# Patient Record
Sex: MALE | Race: BLACK OR AFRICAN AMERICAN | Employment: FULL TIME | URBAN - METROPOLITAN AREA
[De-identification: names, ages, dates, MRNs, and addresses within clinical notes are randomized per-mention and may not be internally consistent; named-entity substitution may affect disease eponyms.]

---

## 2018-10-11 ENCOUNTER — HOSPITAL ENCOUNTER (EMERGENCY)
Facility: CLINIC | Age: 48
Discharge: HOME | End: 2018-10-11
Attending: EMERGENCY MEDICINE | Admitting: EMERGENCY MEDICINE

## 2018-10-11 VITALS
RESPIRATION RATE: 16 BRPM | OXYGEN SATURATION: 98 % | DIASTOLIC BLOOD PRESSURE: 94 MMHG | SYSTOLIC BLOOD PRESSURE: 168 MMHG | TEMPERATURE: 98.3 F

## 2018-10-11 DIAGNOSIS — M54.9 ACUTE BILATERAL BACK PAIN, UNSPECIFIED BACK LOCATION: ICD-10-CM

## 2018-10-11 LAB
ALBUMIN UR-MCNC: NEGATIVE MG/DL
APPEARANCE UR: CLEAR
BILIRUB UR QL STRIP: NEGATIVE
COLOR UR AUTO: ABNORMAL
GLUCOSE UR STRIP-MCNC: NEGATIVE MG/DL
HGB UR QL STRIP: NEGATIVE
KETONES UR STRIP-MCNC: NEGATIVE MG/DL
LEUKOCYTE ESTERASE UR QL STRIP: NEGATIVE
MUCOUS THREADS #/AREA URNS LPF: PRESENT /LPF
NITRATE UR QL: NEGATIVE
PH UR STRIP: 6 PH (ref 5–7)
RBC #/AREA URNS AUTO: 0 /HPF (ref 0–2)
SOURCE: ABNORMAL
SP GR UR STRIP: 1.01 (ref 1–1.03)
SQUAMOUS #/AREA URNS AUTO: <1 /HPF (ref 0–1)
UROBILINOGEN UR STRIP-MCNC: 2 MG/DL (ref 0–2)
WBC #/AREA URNS AUTO: 2 /HPF (ref 0–5)

## 2018-10-11 PROCEDURE — 96372 THER/PROPH/DIAG INJ SC/IM: CPT | Performed by: EMERGENCY MEDICINE

## 2018-10-11 PROCEDURE — 81001 URINALYSIS AUTO W/SCOPE: CPT | Performed by: EMERGENCY MEDICINE

## 2018-10-11 PROCEDURE — 99284 EMERGENCY DEPT VISIT MOD MDM: CPT | Performed by: EMERGENCY MEDICINE

## 2018-10-11 PROCEDURE — 99284 EMERGENCY DEPT VISIT MOD MDM: CPT | Mod: Z6 | Performed by: EMERGENCY MEDICINE

## 2018-10-11 PROCEDURE — 25000128 H RX IP 250 OP 636: Performed by: EMERGENCY MEDICINE

## 2018-10-11 RX ORDER — NAPROXEN 500 MG/1
500 TABLET ORAL 2 TIMES DAILY WITH MEALS
Qty: 30 TABLET | Refills: 0 | Status: SHIPPED | OUTPATIENT
Start: 2018-10-11 | End: 2018-10-26

## 2018-10-11 RX ORDER — CYCLOBENZAPRINE HCL 10 MG
10 TABLET ORAL 3 TIMES DAILY PRN
Qty: 90 TABLET | Refills: 1 | Status: ON HOLD | OUTPATIENT
Start: 2018-10-11 | End: 2019-01-05

## 2018-10-11 RX ORDER — KETOROLAC TROMETHAMINE 30 MG/ML
30 INJECTION, SOLUTION INTRAMUSCULAR; INTRAVENOUS ONCE
Status: COMPLETED | OUTPATIENT
Start: 2018-10-11 | End: 2018-10-11

## 2018-10-11 RX ADMIN — KETOROLAC TROMETHAMINE 30 MG: 30 INJECTION, SOLUTION INTRAMUSCULAR at 21:30

## 2018-10-11 ASSESSMENT — ENCOUNTER SYMPTOMS
DYSURIA: 1
NUMBNESS: 0
BACK PAIN: 1

## 2018-10-11 NOTE — ED AVS SNAPSHOT
[facility], [place], Emergency Department    [address] [place] [address]    Phone:  [phone]                                       Kar Up Jr.   MRN: [id number]    Department:  [facility], Emergency Department   Date of Visit:  10/11/2018           After Visit Summary Signature Page     I have received my discharge instructions, and my questions have been answered. I have discussed any challenges I see with this plan with the nurse or doctor.    ..........................................................................................................................................  Patient/Patient Representative Signature      ..........................................................................................................................................  Patient Representative Print Name and Relationship to Patient    ..................................................               ................................................  Date                                   Time    ..........................................................................................................................................  Reviewed by Signature/Title    ...................................................              ..............................................  Date                                               Time          22EPIC Rev 08/18

## 2018-10-11 NOTE — ED AVS SNAPSHOT
[facility], Emergency Department    [address] [facility] [address]    Phone:  [phone]                                       Kar Up Jr.   MRN: [id number]    Department:  [facility], Emergency Department   Date of Visit:  10/11/2018           Patient Information     Date Of Birth          1970        Your diagnoses for this visit were:     Acute bilateral back pain, unspecified back location        You were seen by Mika Pulido DO.        Discharge Instructions         Please make an appointment to [facility] with [facility] (phone: [phone] as soon as possible even if entirely better.  General Neck and Back Pain    Both neck and back pain are usually caused by injury to the muscles or ligaments of the spine. Sometimes the disks that separate each bone of the spine may cause pain by pressing on a nearby nerve. Back and neck pain may appear after a sudden twisting or bending force (such as in a car accident), or sometimes after a simple awkward movement. In either case, muscle spasm is often present and adds to the pain.  Acute neck and back pain usually gets better in 1 to 2 weeks. Pain related to disk disease, arthritis in the spinal joints or spinal stenosis (narrowing of the spinal canal) can become chronic and last for months or years.  Back and neck pain are common problems. Most people feel better in 1 or 2 weeks, and most of the rest in 1 to 2 months. Most people can remain active.  People have and describe pain differently.    Pain can be sharp, stabbing, shooting, aching, cramping, or burning    Movement, standing, bending, lifting, sitting, or walking may worsen the pain    Pain can be localized to one spot or area, or it can be more generalized    Pain can spread or radiate upwards, downwards, to the front, or go down your arms    Muscle spasm may occur.  Most of the time mechanical problems with the muscles or spine cause the pain. it is usually  caused by an injury, whether known or not, to the muscles or ligaments. While illnesses can cause back pain, it is usually not caused by a serious illness. Pain is usually related to physical activity, whether sports, exercise, work, or normal activity. Sometimes it can occur without an identifiable cause. This can happen simply by stretching or moving wrong, without noting pain at the time. Other causes include:    Overexertion, lifting, pushing, pulling incorrectly or too aggressively.    Sudden twisting, bending or stretching from an accident (car or fall), or accidental movement.    Poor posture    Poor conditioning, lack of regular exercise    Spinal disc disease or arthritis    Stress    Pregnancy, or illness like appendicitis, bladder or kidney infection, pelvic infections   Home care    For neck pain: Use a comfortable pillow that supports the head and keeps the spine in a neutral position. The position of the head should not be tilted forward or backward.    When in bed, try to find a position of comfort. A firm mattress is best. Try lying flat on your back with pillows under your knees. You can also try lying on your side with your knees bent up towards your chest and a pillow between your knees.    At first, do not try to stretch out the sore spots. If there is a strain, it is not like the good soreness you get after exercising without an injury. In this case, stretching may make it worse.    Don't sit for long periods, as in long car rides or other travel. This puts more stress on the lower back than standing or walking.    During the first 24 to 72 hours after an injury, apply an ice pack to the painful area for 20 minutes and then remove it for 20 minutes over a period of 60 to 90 minutes or several times a day.     You can alternate ice and heat therapies. Talk with your healthcare provider about the best treatment for your back or neck pain. As a safety precaution, do not use a heating pad at  bedtime. Sleeping with a heating pad can lead to skin burns or tissue damage.    Therapeutic massage can help relax the back and neck muscles without stretching them.    Be aware of safe lifting methods and do not lift anything over 15 pounds until all the pain is gone.  Medicines  Talk to your healthcare provider before using medicine, especially if you have other medical problems or are taking other medicines.    You may use over-the-counter medicine to control pain, unless another pain medicine was prescribed. If you have chronic conditions like diabetes, liver or kidney disease, stomach ulcers,  gastrointestinal bleeding, or are taking blood thinner medicines.    Be careful if you are given pain medicines, narcotics, or medicine for muscle spasm. They can cause drowsiness, and can affect your coordination, reflexes, and judgment. Do not drive or operate heavy machinery.  Follow-up care  Follow up with your healthcare provider, or as advised. Physical therapy or further tests may be needed.  If X-rays were taken, you will be notified of any new findings that may affect your care.  Call 911  Call 911 if any of the following occur:    Trouble breathing    Confusion    Very drowsy or trouble awakening    Fainting or loss of consciousness    Rapid or very slow heart rate    Loss of bowel or bladder control  When to seek medical advice  Call your healthcare provider right away if any of these occur:    Pain becomes worse or spreads into your arms or legs    Weakness, numbness or pain in one or both arms or legs    Numbness in the groin area    Difficulty walking    Fever of 100.4 F (38 C) or higher, or as directed by your healthcare provider  Date Last Reviewed: 7/1/2016 2000-2017 [employer]. [address] [place] [address], [place] [address]. All rights reserved. This information is not intended as a substitute for professional medical care. Always follow your healthcare professional's  instructions.          24 Hour Appointment Hotline       To make an appointment at any [facility], call [phone] ([phone]). If you don't have a family doctor or clinic, we will help you find one. [facility] clinics are conveniently located to serve the needs of you and your family.             Review of your medicines      START taking        Dose / Directions Last dose taken    cyclobenzaprine 10 MG tablet   Commonly known as:  FLEXERIL   Dose:  10 mg   Quantity:  90 tablet        Take 1 tablet (10 mg) by mouth 3 times daily as needed for muscle spasms   Refills:  1        naproxen 500 MG tablet   Commonly known as:  NAPROSYN   Dose:  500 mg   Quantity:  30 tablet        Take 1 tablet (500 mg) by mouth 2 times daily (with meals) for 15 days   Refills:  0                Prescriptions were sent or printed at these locations (2 Prescriptions)                   Other Prescriptions                Printed at Department/Unit printer (2 of 2)         cyclobenzaprine (FLEXERIL) 10 MG tablet               naproxen (NAPROSYN) 500 MG tablet                Procedures and tests performed during your visit     UA with Microscopic      Orders Needing Specimen Collection     None      Pending Results     No orders found from 10/9/2018 to 10/12/2018.            Pending Culture Results     No orders found from 10/9/2018 to 10/12/2018.            Pending Results Instructions     If you had any lab results that were not finalized at the time of your Discharge, you can call the ED Lab Result RN at [phone]. You will be contacted by this team for any positive Lab results or changes in treatment. The nurses are available 7 days a week from 10A to 6:30P.  You can leave a message 24 hours per day and they will return your call.        Thank you for choosing [facility]       Thank you for choosing [facility] for your care. Our goal is always to provide you with excellent care. Hearing back from our patients is one way we can  "continue to improve our services. Please take a few minutes to complete the written survey that you may receive in the mail after you visit with us. Thank you!        [employer] Information     [facility] lets you send messages to your doctor, view your test results, renew your prescriptions, schedule appointments and more. To sign up, go to [email or web address] . Click on \"Log in\" on the left side of the screen, which will take you to the Welcome page. Then click on \"Sign up Now\" on the right side of the page.     You will be asked to enter the access code listed below, as well as some personal information. Please follow the directions to create your username and password.     Your access code is: [email or web address]  Expires: 2019 10:02 PM     Your access code will  in 90 days. If you need help or a new code, please call your [facility] clinic or [phone].        Care EveryWhere ID     This is your Care EveryWhere ID. This could be used by other organizations to access your [facility] medical records  [id number]        Equal Access to Services     [facility] : Hadrossy Lopez, ronnie white, meño conklin. So [facility] [phone].    ATENCIÓN: Si habla español, tiene a torres disposición servicios gratuitos de asistencia lingüística. Mark al [phone].    We comply with applicable federal civil rights laws and [place] laws. We do not discriminate on the basis of race, color, national origin, age, disability, sex, sexual orientation, or gender identity.            After Visit Summary       This is your record. Keep this with you and show to your community pharmacist(s) and doctor(s) at your next visit.                  "

## 2018-10-12 NOTE — ED PROVIDER NOTES
History     Chief Complaint   Patient presents with     Back Pain     The history is provided by the patient.     Kar Up Jr. is a 48 year old male with a history of type II diabetes, hypertension, and high cholesteral who presents in the Emergency Room with back pain.  Patient for the last 1-2 months has been having intermittent lower back pain; yesterday while standing on top of a 2 foot stool, he states he lost balance, but was able to catch himself before falling. After this incident, he had an acute exacerbation of his back pain and it has been constant since.  His pain has been in his bilateral lower back, but currently is worse on the left side.  He reports having associated tingling in this area as well, but denies any tingling or numbness in his legs. Due to the pain, patient took 3 ibuprofen tablets (200 mg each) last night and has been using an IcyHot cream, he reports minimal relief with these interventions. Patient states that he has had soreness of the back from lifting heavy loads at work but has not previously had any falls or accidents related to the back. Patient additionally complains of intermittent dysuria for the past few weeks.  He denies any penile discharge and reports only being sexually active with his wife.    Of note, patient recently moved to the [facility] and has not yet set up primary care.    I have reviewed the Medications, Allergies, Past Medical and Surgical History, and Social History in the [employer] system.    Past Medical History:   Diagnosis Date     Diabetes (H)        History reviewed. No pertinent surgical history.    No family history on file.    Social History   Substance Use Topics     Smoking status: Not on file     Smokeless tobacco: Not on file     Alcohol use Not on file       No current facility-administered medications for this encounter.      Current Outpatient Prescriptions   Medication     cyclobenzaprine (FLEXERIL) 10 MG tablet     naproxen (NAPROSYN)  500 MG tablet        Allergies   Allergen Reactions     Bactrim [Sulfamethoxazole W/Trimethoprim] Blisters         Review of Systems   Genitourinary: Positive for dysuria (Intermittent). Negative for discharge.   Musculoskeletal: Positive for back pain (Bilateral lower; worse on the left).   Neurological: Negative for numbness.        Positive paresthesias in left lower back    All other systems reviewed and are negative.      Physical Exam   BP: (!) 175/103  Heart Rate: 91  Temp: 98.3  F (36.8  C)  Resp: 16  SpO2: 97 %      Physical Exam   Constitutional: No distress.   HENT:   Head: Atraumatic.   Mouth/Throat: Oropharynx is clear and moist. No oropharyngeal exudate.   Eyes: Pupils are equal, round, and reactive to light. No scleral icterus.   Cardiovascular: Normal heart sounds.    Pulmonary/Chest: Breath sounds normal. No respiratory distress.   Abdominal: Soft. He exhibits no distension. There is no tenderness.   Musculoskeletal: He exhibits no edema.   Mild left lower back tenderness   Neurological: He is alert.   Skin: Skin is warm. He is not diaphoretic.   Psychiatric: He has a normal mood and affect. His behavior is normal.       ED Course   8:59 PM  The patient was seen and examined by Mika Pulido DO in [facility].     ED Course     Procedures           Labs Ordered and Resulted from Time of ED Arrival Up to the Time of Departure from the ED   ROUTINE UA WITH MICROSCOPIC - Abnormal; Notable for the following:        Result Value    Mucous Urine Present (*)     All other components within normal limits           Results for orders placed or performed during the hospital encounter of 10/11/18   UA with Microscopic   Result Value Ref Range    Color Urine Light Yellow     Appearance Urine Clear     Glucose Urine Negative NEG^Negative mg/dL    Bilirubin Urine Negative NEG^Negative    Ketones Urine Negative NEG^Negative mg/dL    Specific Gravity Urine 1.013 1.003 - 1.035    Blood Urine Negative NEG^Negative     pH Urine 6.0 5.0 - 7.0 pH    Protein Albumin Urine Negative NEG^Negative mg/dL    Urobilinogen mg/dL 2.0 0.0 - 2.0 mg/dL    Nitrite Urine Negative NEG^Negative    Leukocyte Esterase Urine Negative NEG^Negative    Source Midstream Urine     WBC Urine 2 0 - 5 /HPF    RBC Urine 0 0 - 2 /HPF    Squamous Epithelial /HPF Urine <1 0 - 1 /HPF    Mucous Urine Present (A) NEG^Negative /LPF         Assessments & Plan (with Medical Decision Making)   40-year-old male presents with a chief complaint of nontraumatic left lower and diffuse back pain.  Patient did not have any known falls, direct blow, car accidents.  He has had mild pain intermittently for several months but it was much worse when he stepped unexpectedly off a ladder and twisted.  He states ibuprofen has been helping with the pain.  Also had a complaint of intermittent dysuria.  His UA here looks clear patient is not concerned about STDs.  We will give the patient a prescription for Naprosyn as well as Flexeril.  Also given the contact number for the primary care center as he has recently moved to the area and does not have a primary care provider at this time.  Also advised him his blood pressure was elevated here.  Recommended he have this rechecked he follows up with primary care.    I have reviewed the nursing notes.    I have reviewed the findings, diagnosis, plan and need for follow up with the patient.    Discharge Medication List as of 10/11/2018 10:02 PM      START taking these medications    Details   cyclobenzaprine (FLEXERIL) 10 MG tablet Take 1 tablet (10 mg) by mouth 3 times daily as needed for muscle spasms, Disp-90 tablet, R-1, Local Print      naproxen (NAPROSYN) 500 MG tablet Take 1 tablet (500 mg) by mouth 2 times daily (with meals) for 15 days, Disp-30 tablet, R-0, Local Print             Final diagnoses:   Acute bilateral back pain, unspecified back location     I, Hima Jha, am serving as a trained medical scribe to document services  personally performed by Mika Pulido DO, based on the provider's statements to me.      I, Mika Pulido DO, was physically present and have reviewed and verified the accuracy of this note documented by Hima Jha.    10/11/2018   [facility], [place], EMERGENCY DEPARTMENT     Mika Pulido DO  10/11/18 [id number]

## 2018-10-12 NOTE — DISCHARGE INSTRUCTIONS
Please make an appointment to follow [facility] with [facility] (phone: [phone] as soon as possible even if entirely better.  General Neck and Back Pain    Both neck and back pain are usually caused by injury to the muscles or ligaments of the spine. Sometimes the disks that separate each bone of the spine may cause pain by pressing on a nearby nerve. Back and neck pain may appear after a sudden twisting or bending force (such as in a car accident), or sometimes after a simple awkward movement. In either case, muscle spasm is often present and adds to the pain.  Acute neck and back pain usually gets better in 1 to 2 weeks. Pain related to disk disease, arthritis in the spinal joints or spinal stenosis (narrowing of the spinal canal) can become chronic and last for months or years.  Back and neck pain are common problems. Most people feel better in 1 or 2 weeks, and most of the rest in 1 to 2 months. Most people can remain active.  People have and describe pain differently.    Pain can be sharp, stabbing, shooting, aching, cramping, or burning    Movement, standing, bending, lifting, sitting, or walking may worsen the pain    Pain can be localized to one spot or area, or it can be more generalized    Pain can spread or radiate upwards, downwards, to the front, or go down your arms    Muscle spasm may occur.  Most of the time mechanical problems with the muscles or spine cause the pain. it is usually caused by an injury, whether known or not, to the muscles or ligaments. While illnesses can cause back pain, it is usually not caused by a serious illness. Pain is usually related to physical activity, whether sports, exercise, work, or normal activity. Sometimes it can occur without an identifiable cause. This can happen simply by stretching or moving wrong, without noting pain at the time. Other causes include:    Overexertion, lifting, pushing, pulling incorrectly or too aggressively.    Sudden twisting,  bending or stretching from an accident (car or fall), or accidental movement.    Poor posture    Poor conditioning, lack of regular exercise    Spinal disc disease or arthritis    Stress    Pregnancy, or illness like appendicitis, bladder or kidney infection, pelvic infections   Home care    For neck pain: Use a comfortable pillow that supports the head and keeps the spine in a neutral position. The position of the head should not be tilted forward or backward.    When in bed, try to find a position of comfort. A firm mattress is best. Try lying flat on your back with pillows under your knees. You can also try lying on your side with your knees bent up towards your chest and a pillow between your knees.    At first, do not try to stretch out the sore spots. If there is a strain, it is not like the good soreness you get after exercising without an injury. In this case, stretching may make it worse.    Don't sit for long periods, as in long car rides or other travel. This puts more stress on the lower back than standing or walking.    During the first 24 to 72 hours after an injury, apply an ice pack to the painful area for 20 minutes and then remove it for 20 minutes over a period of 60 to 90 minutes or several times a day.     You can alternate ice and heat therapies. Talk with your healthcare provider about the best treatment for your back or neck pain. As a safety precaution, do not use a heating pad at bedtime. Sleeping with a heating pad can lead to skin burns or tissue damage.    Therapeutic massage can help relax the back and neck muscles without stretching them.    Be aware of safe lifting methods and do not lift anything over 15 pounds until all the pain is gone.  Medicines  Talk to your healthcare provider before using medicine, especially if you have other medical problems or are taking other medicines.    You may use over-the-counter medicine to control pain, unless another pain medicine was prescribed.  If you have chronic conditions like diabetes, liver or kidney disease, stomach ulcers,  gastrointestinal bleeding, or are taking blood thinner medicines.    Be careful if you are given pain medicines, narcotics, or medicine for muscle spasm. They can cause drowsiness, and can affect your coordination, reflexes, and judgment. Do not drive or operate heavy machinery.  Follow-up care  Follow up with your healthcare provider, or as advised. Physical therapy or further tests may be needed.  If X-rays were taken, you will be notified of any new findings that may affect your care.  Call 911  Call 911 if any of the following occur:    Trouble breathing    Confusion    Very drowsy or trouble awakening    Fainting or loss of consciousness    Rapid or very slow heart rate    Loss of bowel or bladder control  When to seek medical advice  Call your healthcare provider right away if any of these occur:    Pain becomes worse or spreads into your arms or legs    Weakness, numbness or pain in one or both arms or legs    Numbness in the groin area    Difficulty walking    Fever of 100.4 F (38 C) or higher, or as directed by your healthcare provider  Date Last Reviewed: 7/1/2016 2000-2017 [employer]. [address] [place] [address]. All rights reserved. This information is not intended as a substitute for professional medical care. Always follow your healthcare professional's instructions.

## 2018-10-12 NOTE — ED TRIAGE NOTES
Pt presents to ED for a complaint of lower back pain. PT was working when he lost his balance on a ladder and stepped off awkwardly and now has some back pain. Pt also complaining of dysuria.

## 2018-11-03 ENCOUNTER — HOSPITAL ENCOUNTER (EMERGENCY)
Facility: CLINIC | Age: 48
Discharge: HOME | End: 2018-11-03
Attending: EMERGENCY MEDICINE | Admitting: EMERGENCY MEDICINE
Payer: MEDICAID

## 2018-11-03 ENCOUNTER — APPOINTMENT (OUTPATIENT)
Dept: GENERAL RADIOLOGY | Facility: CLINIC | Age: 48
End: 2018-11-03
Attending: EMERGENCY MEDICINE
Payer: MEDICAID

## 2018-11-03 VITALS
DIASTOLIC BLOOD PRESSURE: 82 MMHG | TEMPERATURE: 98.2 F | HEART RATE: 102 BPM | OXYGEN SATURATION: 100 % | RESPIRATION RATE: 20 BRPM | HEIGHT: 74 IN | SYSTOLIC BLOOD PRESSURE: 148 MMHG

## 2018-11-03 DIAGNOSIS — R07.89 OTHER CHEST PAIN: ICD-10-CM

## 2018-11-03 DIAGNOSIS — Z79.4 OTHER SPECIFIED DIABETES MELLITUS WITHOUT COMPLICATION, WITH LONG-TERM CURRENT USE OF INSULIN (H): ICD-10-CM

## 2018-11-03 DIAGNOSIS — E13.9 OTHER SPECIFIED DIABETES MELLITUS WITHOUT COMPLICATION, WITH LONG-TERM CURRENT USE OF INSULIN (H): ICD-10-CM

## 2018-11-03 DIAGNOSIS — Z79.4 ENCOUNTER FOR LONG-TERM (CURRENT) USE OF INSULIN (H): ICD-10-CM

## 2018-11-03 LAB
ANION GAP SERPL CALCULATED.3IONS-SCNC: 8 MMOL/L (ref 3–14)
BASOPHILS # BLD AUTO: 0 10E9/L (ref 0–0.2)
BASOPHILS NFR BLD AUTO: 0.2 %
BUN SERPL-MCNC: 19 MG/DL (ref 7–30)
CALCIUM SERPL-MCNC: 8.6 MG/DL (ref 8.5–10.1)
CHLORIDE SERPL-SCNC: 98 MMOL/L (ref 94–109)
CO2 BLDCOV-SCNC: 28 MMOL/L (ref 21–28)
CO2 SERPL-SCNC: 25 MMOL/L (ref 20–32)
CREAT SERPL-MCNC: 1.37 MG/DL (ref 0.66–1.25)
DIFFERENTIAL METHOD BLD: NORMAL
EOSINOPHIL # BLD AUTO: 0.1 10E9/L (ref 0–0.7)
EOSINOPHIL NFR BLD AUTO: 0.9 %
ERYTHROCYTE [DISTWIDTH] IN BLOOD BY AUTOMATED COUNT: 12.7 % (ref 10–15)
GFR SERPL CREATININE-BSD FRML MDRD: 55 ML/MIN/1.7M2
GLUCOSE BLDC GLUCOMTR-MCNC: 256 MG/DL (ref 70–99)
GLUCOSE BLDC GLUCOMTR-MCNC: 279 MG/DL (ref 70–99)
GLUCOSE BLDC GLUCOMTR-MCNC: >600 MG/DL (ref 70–99)
GLUCOSE SERPL-MCNC: 648 MG/DL (ref 70–99)
HCT VFR BLD AUTO: 48.9 % (ref 40–53)
HGB BLD-MCNC: 16.3 G/DL (ref 13.3–17.7)
IMM GRANULOCYTES # BLD: 0 10E9/L (ref 0–0.4)
IMM GRANULOCYTES NFR BLD: 0.3 %
LACTATE BLD-SCNC: 1.6 MMOL/L (ref 0.7–2.1)
LYMPHOCYTES # BLD AUTO: 2.5 10E9/L (ref 0.8–5.3)
LYMPHOCYTES NFR BLD AUTO: 29 %
MCH RBC QN AUTO: 30.9 PG (ref 26.5–33)
MCHC RBC AUTO-ENTMCNC: 33.3 G/DL (ref 31.5–36.5)
MCV RBC AUTO: 93 FL (ref 78–100)
MONOCYTES # BLD AUTO: 0.3 10E9/L (ref 0–1.3)
MONOCYTES NFR BLD AUTO: 3.7 %
NEUTROPHILS # BLD AUTO: 5.7 10E9/L (ref 1.6–8.3)
NEUTROPHILS NFR BLD AUTO: 65.9 %
NRBC # BLD AUTO: 0 10*3/UL
NRBC BLD AUTO-RTO: 0 /100
PCO2 BLDV: 46 MM HG (ref 40–50)
PH BLDV: 7.39 PH (ref 7.32–7.43)
PLATELET # BLD AUTO: 177 10E9/L (ref 150–450)
PO2 BLDV: 56 MM HG (ref 25–47)
POTASSIUM SERPL-SCNC: 4.8 MMOL/L (ref 3.4–5.3)
RBC # BLD AUTO: 5.28 10E12/L (ref 4.4–5.9)
SAO2 % BLDV FROM PO2: 88 %
SODIUM SERPL-SCNC: 132 MMOL/L (ref 133–144)
TROPONIN I SERPL-MCNC: <0.015 UG/L (ref 0–0.04)
WBC # BLD AUTO: 8.7 10E9/L (ref 4–11)

## 2018-11-03 PROCEDURE — 84484 ASSAY OF TROPONIN QUANT: CPT | Performed by: EMERGENCY MEDICINE

## 2018-11-03 PROCEDURE — 25000132 ZZH RX MED GY IP 250 OP 250 PS 637: Performed by: EMERGENCY MEDICINE

## 2018-11-03 PROCEDURE — 99285 EMERGENCY DEPT VISIT HI MDM: CPT | Mod: Z6 | Performed by: EMERGENCY MEDICINE

## 2018-11-03 PROCEDURE — 85025 COMPLETE CBC W/AUTO DIFF WBC: CPT | Performed by: EMERGENCY MEDICINE

## 2018-11-03 PROCEDURE — 82803 BLOOD GASES ANY COMBINATION: CPT

## 2018-11-03 PROCEDURE — 25000128 H RX IP 250 OP 636: Performed by: EMERGENCY MEDICINE

## 2018-11-03 PROCEDURE — 00000146 ZZHCL STATISTIC GLUCOSE BY METER IP

## 2018-11-03 PROCEDURE — 80048 BASIC METABOLIC PNL TOTAL CA: CPT | Performed by: EMERGENCY MEDICINE

## 2018-11-03 PROCEDURE — 25000131 ZZH RX MED GY IP 250 OP 636 PS 637: Performed by: EMERGENCY MEDICINE

## 2018-11-03 PROCEDURE — 93010 ELECTROCARDIOGRAM REPORT: CPT | Mod: Z6 | Performed by: EMERGENCY MEDICINE

## 2018-11-03 PROCEDURE — 99285 EMERGENCY DEPT VISIT HI MDM: CPT | Mod: 25

## 2018-11-03 PROCEDURE — 96374 THER/PROPH/DIAG INJ IV PUSH: CPT

## 2018-11-03 PROCEDURE — 96372 THER/PROPH/DIAG INJ SC/IM: CPT

## 2018-11-03 PROCEDURE — 71046 X-RAY EXAM CHEST 2 VIEWS: CPT

## 2018-11-03 PROCEDURE — 83605 ASSAY OF LACTIC ACID: CPT

## 2018-11-03 PROCEDURE — 96361 HYDRATE IV INFUSION ADD-ON: CPT | Mod: 59

## 2018-11-03 RX ADMIN — SODIUM CHLORIDE 1000 ML: 9 INJECTION, SOLUTION INTRAVENOUS at 01:28

## 2018-11-03 RX ADMIN — HUMAN INSULIN 10 UNITS: 100 INJECTION, SOLUTION SUBCUTANEOUS at 02:00

## 2018-11-03 RX ADMIN — INSULIN ASPART 5 UNITS: 100 INJECTION, SOLUTION INTRAVENOUS; SUBCUTANEOUS at 05:05

## 2018-11-03 RX ADMIN — INSULIN DETEMIR 20 UNITS: 100 INJECTION, SOLUTION SUBCUTANEOUS at 05:05

## 2018-11-03 ASSESSMENT — ENCOUNTER SYMPTOMS
COUGH: 1
LIGHT-HEADEDNESS: 1

## 2018-11-03 NOTE — DISCHARGE INSTRUCTIONS
Thank you for coming to the [facility] Emergency Department.     Please restart your insulin: levemir 20 units at bedtime and novolog 5 units before meals.     Check blood sugar at meals and bedtime. Return to ER for blood sugars over 400.     Please make an appointment to follow [facility] with [facility] (phone: [phone] as soon as possible.    You could also follow up at [facility]. Call as soon as possible to schedule.   [phone]  [email or web address]  [address] [place] [address]

## 2018-11-03 NOTE — ED AVS SNAPSHOT
[facility], Emergency Department    [address] [facility] [address]    Phone:  [phone]                                       Kar Up Jr.   MRN: [id number]    Department:  [facility], Emergency Department   Date of Visit:  11/3/2018           Patient Information     Date Of Birth          1970        Your diagnoses for this visit were:     Other specified diabetes mellitus without complication, with long-term current use of insulin (H)        You were seen by Beverly Agudelo MD.        Discharge Instructions       Thank you for coming to the [facility] Emergency Department.     Please restart your insulin: levemir 20 units at bedtime and novolog 5 units before meals.     Check blood sugar at meals and bedtime. Return to ER for blood sugars over 400.     Please make an appointment to follow up with [facility] (phone: [phone] as soon as possible.    You could also follow up at [facility]. Call as soon as possible to schedule.   [phone]  [email or web address]/  [address] [place] [address]        24 Hour Appointment Hotline       To make an appointment at any [facility], call [phone] ([phone]). If you don't have a family doctor or clinic, we will help you find one. [facility] clinics are conveniently located to serve the needs of you and your family.             Review of your medicines      START taking        Dose / Directions Last dose taken    insulin aspart 100 UNIT/ML injection   Commonly known as:  NovoLOG FLEXPEN   Quantity:  15 mL        5 units before breakfast, 5 units before lunch, 5 units before dinner   Refills:  1        insulin detemir 100 UNIT/ML injection   Commonly known as:  LEVEMIR FLEXPEN/FLEXTOUCH   Dose:  20 Units   Quantity:  15 mL        Inject 20 Units Subcutaneous At Bedtime   Refills:  1          Our records show that you are taking the medicines listed below. If these are  incorrect, please call your family doctor or clinic.        Dose / Directions Last dose taken    cyclobenzaprine 10 MG tablet   Commonly known as:  FLEXERIL   Dose:  10 mg   Quantity:  90 tablet        Take 1 tablet (10 mg) by mouth 3 times daily as needed for muscle spasms   Refills:  1                Prescriptions were sent or printed at these locations (2 Prescriptions)                   Other Prescriptions                Printed at Department/Unit printer (2 of 2)         insulin aspart (NOVOLOG FLEXPEN) 100 UNIT/ML injection               insulin detemir (LEVEMIR FLEXPEN/FLEXTOUCH) 100 UNIT/ML injection                Procedures and tests performed during your visit     Procedure/Test Number of Times Performed    Basic metabolic panel 1    CBC with platelets differential 1    Chest XR,  PA & LAT 1    EKG 12 lead 1    Glucose by meter 2    ISTAT CG4 gases lactate amrita nursing POCT 1    ISTAT gases lactate amrita POCT 1    Troponin I 1      Orders Needing Specimen Collection     None      Pending Results     Date and Time Order Name Status Description    11/3/2018 0050 Chest XR,  PA & LAT Preliminary     11/3/2018 0026 EKG 12 lead Preliminary             Pending Culture Results     No orders found from 11/1/2018 to 11/4/2018.            Pending Results Instructions     If you had any lab results that were not finalized at the time of your Discharge, you can call the ED Lab Result RN at [phone]. You will be contacted by this team for any positive Lab results or changes in treatment. The nurses are available 7 days a week from 10A to 6:30P.  You can leave a message 24 hours per day and they will return your call.        Thank you for choosing [facility]       Thank you for choosing [facility] for your care. Our goal is always to provide you with excellent care. Hearing back from our patients is one way we can continue to improve our services. Please take a few minutes to complete the written survey that you may  "receive in the mail after you visit with us. Thank you!        [employer] Information     [employer] lets you send messages to your doctor, view your test results, renew your prescriptions, schedule appointments and more. To sign up, go to [email or web address] . Click on \"Log in\" on the left side of the screen, which will take you to the Welcome page. Then click on \"Sign up Now\" on the right side of the page.     You will be asked to enter the access code listed below, as well as some personal information. Please follow the directions to create your username and password.     Your access code is: [email or web address]  Expires: 2019 10:02 PM     Your access code will  in 90 days. If you need help or a new code, please call your [facility] clinic or [phone].        Care EveryWhere ID     This is your Care EveryWhere ID. This could be used by other organizations to access your [facility] medical records  [id number]        Equal Access to Services     [facility] : Hadii drake maldonadoo Sooswaldoali, waaxda luqadaha, qaybta kaalmada adeantoinetteyaadalberto, meño lowe . So [facility] [phone].    ATENCIÓN: Si habla español, tiene a torres disposición servicios gratuitos de asistencia lingüística. Llame al [phone].    We comply with applicable federal civil rights laws and [place] laws. We do not discriminate on the basis of race, color, national origin, age, disability, sex, sexual orientation, or gender identity.            After Visit Summary       This is your record. Keep this with you and show to your community pharmacist(s) and doctor(s) at your next visit.                  "

## 2018-11-03 NOTE — ED AVS SNAPSHOT
[facility] [place], Emergency Department    [address] [place] [address]    Phone:  [phone]                                       Kar Up Jr.   MRN: [id number]    Department:  [facility], Emergency Department   Date of Visit:  11/3/2018           After Visit Summary Signature Page     I have received my discharge instructions, and my questions have been answered. I have discussed any challenges I see with this plan with the nurse or doctor.    ..........................................................................................................................................  Patient/Patient Representative Signature      ..........................................................................................................................................  Patient Representative Print Name and Relationship to Patient    ..................................................               ................................................  Date                                   Time    ..........................................................................................................................................  Reviewed by Signature/Title    ...................................................              ..............................................  Date                                               Time          22EPIC Rev 08/18

## 2018-11-03 NOTE — ED PROVIDER NOTES
[place] EMERGENCY DEPARTMENT ([facility])  11/03/18   History     Chief Complaint   Patient presents with     Hyperglycemia     The history is provided by the patient.     Kar Up Jr. is a 48 year old male with a medical history significant for diabetes mellitus, hypertension and high cholesterol who presents to the Emergency Department for evaluation of hyperglycemia.  Patient reports that he moved to the [place] a few months ago and has not yet established primary care.  Patient also does not currently have medical insurance; however, does have an appointment to have medical insurance started.  Patient reports that he has not been taking his diabetes mellitus regimen for a few months.  Patient is on NovoLog 5 units before every meal, Levemir 20-25 units at bedtime. He was previously on metformin.  Patient reports that his NovoLog and Levemir have not been refrigerated, so he is unsure whether or not they are still good.  Patient also reports that he has not been taking his blood pressure medications or his high cholesterol medications as they make him feel drowsy.  Patient reports that he was diagnosed with diabetes mellitus 20 years ago.  Patient here is complaining of polyuria and lightheadedness.  He is also complaining of exertional left-sided chest pain which has been present for the past week; which he reports is mostly present when he is changing positions.  He describes it as a sharp type pain.  Patient reports that the pain has been worsening over the past week.  He denies any worsening of the pain when laying down flat.  Patient is also complaining of a productive cough.  Patient denies any history of cardiac disease and denies any past abdominal surgeries.    I have reviewed the Medications, Allergies, Past Medical and Surgical History, and Social History in the [employer] system.    Past Medical History:   Diagnosis Date     Diabetes (H)        History reviewed. No pertinent surgical  "history.    No family history on file.    Social History   Substance Use Topics     Smoking status: Current Every Day Smoker     Packs/day: 1.50     Smokeless tobacco: Never Used     Alcohol use No       No current facility-administered medications for this encounter.      Current Outpatient Prescriptions   Medication     insulin aspart (NOVOLOG FLEXPEN) 100 UNIT/ML injection     insulin detemir (LEVEMIR FLEXPEN/FLEXTOUCH) 100 UNIT/ML injection     cyclobenzaprine (FLEXERIL) 10 MG tablet        Allergies   Allergen Reactions     Bactrim [Sulfamethoxazole W/Trimethoprim] Blisters         Review of Systems   Respiratory: Positive for cough (productive).    Cardiovascular: Positive for chest pain (left sided; exertional with position changes).   Endocrine: Positive for polyuria.   Neurological: Positive for light-headedness.   All other systems reviewed and are negative.      Physical Exam   BP: (!) 174/104  Pulse: 102  Heart Rate: 95  Temp: 98.2  F (36.8  C)  Resp: 20  Height: 188 cm (6' 2\")  SpO2: 97 %      Physical Exam   Gen:A&Ox3, no acute distress  HEENT:PERRL, no facial tenderness or wounds, head atraumatic, oropharynx clear, mucous membranes moist, TMs clear bilaterally  Neck:no bony tenderness or step offs, no JVD, trachea midline  Back: no CVA tenderness, no midline bony tenderness  CV:RRR without murmurs  PULM:Clear to auscultation bilaterally  Abd:soft, nontender, nondistended. Bowel sounds present and normal  UE:No traumatic injuries, skin normal  LE:no traumatic injuries, skin normal, no LE edema  Neuro:CN II-XII intact, strength 5/5 throughout, gait stable.   Skin: no rashes or ecchymoses      ED Course   12:33 AM  The patient was seen and examined by Beverly Agudelo MD in Room ED11.     ED Course     Procedures             EKG Interpretation:      Interpreted by Beverly Agudelo  Time reviewed: 12:31AM  Symptoms at time of EKG: chest discomfort and hyperglycemia  Rhythm: normal sinus   Rate: 98  Axis: " normal  Ectopy: none  Conduction: normal  ST Segments/ T Waves: No ST-T wave changes  Q Waves: none  Comparison to prior: No old EKG available    Clinical Impression: normal EKG      Critical Care time:  none             Labs Ordered and Resulted from Time of ED Arrival Up to the Time of Departure from the ED   BASIC METABOLIC PANEL - Abnormal; Notable for the following:        Result Value    Sodium 132 (*)     Glucose 648 (*)     Creatinine 1.37 (*)     GFR Estimate 55 (*)     All other components within normal limits   GLUCOSE BY METER - Abnormal; Notable for the following:     Glucose >600 (*)     All other components within normal limits   GLUCOSE BY METER - Abnormal; Notable for the following:     Glucose 256 (*)     All other components within normal limits   ISTAT  GASES LACTATE MARYCARMEN POCT - Abnormal; Notable for the following:     PO2 Venous 56 (*)     All other components within normal limits   CBC WITH PLATELETS DIFFERENTIAL   TROPONIN I   ISTAT CG4 GASES LACTATE MARYCARMEN NURSING POCT            Assessments & Plan (with Medical Decision Making)   49 yo M with a hx of insulin-dependent diabetes presenting with symptomatic hyperglycemia in the setting of missed insulin dosing for several months. Pt newly relocated to the [place] and is without established primary care.   Arrived with HTN with /104 and tachycardia with .   IV access obtained and lab testing done. Labs without acidosis, anion gap or significant electrolyte abnormalities.   Glucose >600 initially. Treated with 1L of 0.9NS followed by 10 units of regular insulin IV.   EKG without acute ischemic abnormalities and troponin negative.   Follow up blood sugars 250-270. Pt felt improved and requested to eat, so he was given an additional 5 units IM Novolog and his daily dose of Levemir. He was provided with a new supply of short and long acting insulin and referred to primary care.   Discharged.     I have reviewed the nursing notes.    I have  reviewed the findings, diagnosis, plan and need for follow up with the patient.    Discharge Medication List as of 11/3/2018  5:02 AM      START taking these medications    Details   insulin aspart (NOVOLOG FLEXPEN) 100 UNIT/ML injection 5 units before breakfast, 5 units before lunch, 5 units before dinner, Disp-15 mL, R-1, Local Print      insulin detemir (LEVEMIR FLEXPEN/FLEXTOUCH) 100 UNIT/ML injection Inject 20 Units Subcutaneous At Bedtime, Disp-15 mL, R-1, Local Print             Final diagnoses:   Other specified diabetes mellitus without complication, with long-term current use of insulin (H)     IOwen, am serving as a trained medical scribe to document services personally performed by Beverly Agudelo MD, based on the provider's statements to me.   IBeverly MD, was physically present and have reviewed and verified the accuracy of this note documented by Owen Spangler.    11/3/2018   [facility] [place], EMERGENCY DEPARTMENT    MD AMITA Corcoran Katrina Anne, MD  11/12/18 1007

## 2018-11-03 NOTE — ED TRIAGE NOTES
"Pt arrives with hyperglycemia, BGs in the 500s about one hour ago. Pt states he hasn't taken his insulin \"in a while\" due to low BGs. Reports excessive thirst and urination. Also reports chest pain x1 week.   "

## 2018-11-04 LAB — INTERPRETATION ECG - MUSE: NORMAL

## 2018-11-15 ENCOUNTER — HOSPITAL ENCOUNTER (OUTPATIENT)
Facility: CLINIC | Age: 48
Setting detail: OBSERVATION
Discharge: HOME | End: 2018-11-17
Attending: EMERGENCY MEDICINE | Admitting: EMERGENCY MEDICINE
Payer: MEDICAID

## 2018-11-15 DIAGNOSIS — R00.0 SINUS TACHYCARDIA: ICD-10-CM

## 2018-11-15 DIAGNOSIS — N28.9 RENAL INSUFFICIENCY: ICD-10-CM

## 2018-11-15 DIAGNOSIS — R73.9 HYPERGLYCEMIA: ICD-10-CM

## 2018-11-15 LAB
BASOPHILS # BLD AUTO: 0 10E9/L (ref 0–0.2)
BASOPHILS NFR BLD AUTO: 0.3 %
DIFFERENTIAL METHOD BLD: NORMAL
EOSINOPHIL # BLD AUTO: 0.1 10E9/L (ref 0–0.7)
EOSINOPHIL NFR BLD AUTO: 0.9 %
ERYTHROCYTE [DISTWIDTH] IN BLOOD BY AUTOMATED COUNT: 12.5 % (ref 10–15)
GLUCOSE BLDC GLUCOMTR-MCNC: >600 MG/DL (ref 70–99)
HCT VFR BLD AUTO: 46.2 % (ref 40–53)
HGB BLD-MCNC: 15.9 G/DL (ref 13.3–17.7)
IMM GRANULOCYTES # BLD: 0 10E9/L (ref 0–0.4)
IMM GRANULOCYTES NFR BLD: 0.1 %
KETONES BLD-SCNC: 0.1 MMOL/L (ref 0–0.6)
LYMPHOCYTES # BLD AUTO: 2.2 10E9/L (ref 0.8–5.3)
LYMPHOCYTES NFR BLD AUTO: 27.9 %
MCH RBC QN AUTO: 30.7 PG (ref 26.5–33)
MCHC RBC AUTO-ENTMCNC: 34.4 G/DL (ref 31.5–36.5)
MCV RBC AUTO: 89 FL (ref 78–100)
MONOCYTES # BLD AUTO: 0.5 10E9/L (ref 0–1.3)
MONOCYTES NFR BLD AUTO: 6.4 %
NEUTROPHILS # BLD AUTO: 5.2 10E9/L (ref 1.6–8.3)
NEUTROPHILS NFR BLD AUTO: 64.4 %
NRBC # BLD AUTO: 0 10*3/UL
NRBC BLD AUTO-RTO: 0 /100
PLATELET # BLD AUTO: 163 10E9/L (ref 150–450)
RBC # BLD AUTO: 5.18 10E12/L (ref 4.4–5.9)
WBC # BLD AUTO: 8 10E9/L (ref 4–11)

## 2018-11-15 PROCEDURE — 84484 ASSAY OF TROPONIN QUANT: CPT | Performed by: EMERGENCY MEDICINE

## 2018-11-15 PROCEDURE — 80053 COMPREHEN METABOLIC PANEL: CPT | Performed by: EMERGENCY MEDICINE

## 2018-11-15 PROCEDURE — 93010 ELECTROCARDIOGRAM REPORT: CPT | Mod: Z6 | Performed by: EMERGENCY MEDICINE

## 2018-11-15 PROCEDURE — 99285 EMERGENCY DEPT VISIT HI MDM: CPT | Mod: 25 | Performed by: EMERGENCY MEDICINE

## 2018-11-15 PROCEDURE — 00000146 ZZHCL STATISTIC GLUCOSE BY METER IP

## 2018-11-15 PROCEDURE — 83036 HEMOGLOBIN GLYCOSYLATED A1C: CPT | Performed by: EMERGENCY MEDICINE

## 2018-11-15 PROCEDURE — 93005 ELECTROCARDIOGRAM TRACING: CPT | Performed by: EMERGENCY MEDICINE

## 2018-11-15 PROCEDURE — 85025 COMPLETE CBC W/AUTO DIFF WBC: CPT | Performed by: EMERGENCY MEDICINE

## 2018-11-15 PROCEDURE — 82010 KETONE BODYS QUAN: CPT | Performed by: EMERGENCY MEDICINE

## 2018-11-15 ASSESSMENT — ENCOUNTER SYMPTOMS
DIARRHEA: 0
NAUSEA: 0
SHORTNESS OF BREATH: 0
FREQUENCY: 1
CHILLS: 0
FEVER: 0
POLYDIPSIA: 1
VOMITING: 0

## 2018-11-15 NOTE — IP AVS SNAPSHOT
Unit 6D Observation [address] [address]    Phone:  [phone]    Fax:  [phone]                                       After Visit Summary   11/15/2018    Kar Up Jr.    MRN: [id number]           After Visit Summary Signature Page     I have received my discharge instructions, and my questions have been answered. I have discussed any challenges I see with this plan with the nurse or doctor.    ..........................................................................................................................................  Patient/Patient Representative Signature      ..........................................................................................................................................  Patient Representative Print Name and Relationship to Patient    ..................................................               ................................................  Date                                   Time    ..........................................................................................................................................  Reviewed by Signature/Title    ...................................................              ..............................................  Date                                               Time          22EPIC Rev 08/18

## 2018-11-15 NOTE — LETTER
November 17, 2018      Kar Up Jr.  [address]   [place] [address]        To Whom It May Concern:    Kar Up Jr.  was seen on 11/15/18 at the emergency department and he was admitted through today 11/17/18. Please excuse him from work through 11/18/18. If you have any further question, please call [phone] and ask for the ED observation provider.      Sincerely,        Emergency Department Observation Unit APPs.

## 2018-11-15 NOTE — IP AVS SNAPSHOT
MRN:[id number]                      After Visit Summary   11/15/2018    Kar Up Jr.    MRN: [id number]           Thank you!     Thank you for choosing [facility] for your care. Our goal is always to provide you with excellent care. Hearing back from our patients is one way we can continue to improve our services. Please take a few minutes to complete the written survey that you may receive in the mail after you visit with us. Thank you!        Patient Information     Date Of Birth          1970        About your hospital stay     You were admitted on:  November 16, 2018 You last received care in the:  Unit 6D Observation [facility]    You were discharged on:  November 17, 2018        Reason for your hospital stay       Hyperglycemia                  Who to Call     For medical emergencies, please call 911.  For non-urgent questions about your medical care, please call your primary care provider or clinic, None          Attending Provider     Provider Specialty    Carol Castro MD Emergency Medicine    Belen Saenz MD Emergency Medicine    Alfonso Varela MD [facility], Miranda Bernstein MD Emergency Medicine       Primary Care Provider Fax #    Physician No Ref-Primary [phone]      After Care Instructions     Activity       Your activity upon discharge: activity as tolerated            Diet       Follow this diet upon discharge: Diabetic diet                  Follow-up Appointments     Adult [facility] Follow-up and recommended labs and tests       --Follow up with primary care provider at [facility] on Monday 11/19/18.   --It is important that you take your medications and check your blood glucose as following:  -Please take your medications as following and check your blood sugar as instructed below      -Glargine 32 units qAM starting today      -Aspart for meals: 12 units per meal.  Work on eliminating snacks or having low/no carb snacks.      -Aspart  for correction: increased to high intensity and hs       HIGH INSULIN RESISTANCE DOSING         Do not take bedtime correction Insulin if BG less than 200.        For  - 224 take 1 units.        For  - 249 take 2 units.        For  - 274 take 3 units.        For  - 299 take 4 units.        For  - 324 take 5 units.        For  - 349 take 6 units.        For BG greater than or equal to 350 give 7 units.        Notify your provider if glucose greater than or equal to 350 after administration of correction dose.      -Continue metformin 500mg with bfast, 500mg with supper--- increase to 1000mg BID on 11/19/18 if tolerating.  -Check blood sugar before meals and bedtime.  -Please go to your appointment with [facility]  -Cpeptide and ARI antibody are pending-- follow up on results at [facility] on Monday.                  Further instructions from your care team       Primary Care Physician appointment scheduled for you:    Monday, 11/19/18 at 2:40pm.  Please arrive at 2:25pm.  [facility] ([facility])  [address] [place] [address]  [id number]  [phone]    Please bring hospital discharge summary with you to appointment, and any medications that you are taking.    DIABETES  Plan for home:  -Metformin 500mg with breakfast, 500mg with supper.  Increase to 1000mg with bfast, 1000mg with supper on 11/19 if tolerating.  -Lantus 32 units every morning (you can substitute Lantus with Levemir if needed)  -Novolog for meals: 12 units per meal.  Try to avoid snacking between meals.  If you need to have a snack try to have something that is lower in carbohydrates (eggs, cheese, meat, water).  -Novolog for correction based on the following sliding scales:    Sliding Scale for blood sugar checked before meals:  Do Not give Correction Insulin if Pre-Meal BG less than 140.   For Pre-Meal  - 164 give 1 unit.   For Pre-Meal  - 189 give 2 units.   For Pre-Meal  - 214  "give 3 units.   For Pre-Meal  - 239 give 4 units.   For Pre-Meal  - 264 give 5 units.   For Pre-Meal  - 289 give 6 units.   For Pre-Meal  - 314 give 7 units.   For Pre-Meal  - 339 give 8 units.   For Pre-Meal  - 364 give 9 units.   For Pre-Meal BG greater than or equal to 365 give 10 units    Sliding Scale for blood sugar checked at bedtime:  Do Not give Bedtime Correction Insulin if BG less than 200.   For  - 224 give 1 units.   For  - 249 give 2 units.   For  - 274 give 3 units.   For  - 299 give 4 units.   For  - 324 give 5 units.   For  - 349 give 6 units.   For BG greater than or equal to 350 give 7 units.     -Check blood sugar before meals and bedtime    -Follow up on diabetes at [facility] on Monday 11/19/18.    Pending Results     Date and Time Order Name Status Description    11/16/2018 1502 Glutamic acid decarboxylase antibody In process     11/16/2018 1502 C-peptide In process             Statement of Approval     Ordered          11/17/18 1415  I have reviewed and agree with all the recommendations and orders detailed in this document.  EFFECTIVE NOW     Approved and electronically signed by:  Marilu Murphy APRN CNP             Admission Information     Date & Time Provider Department Dept. Phone    11/15/2018 Miranda Jimenez MD Unit 6D Observation [facility] [place] [phone]      Your Vitals Were     Blood Pressure Pulse Temperature Respirations Height Weight    134/91 (BP Location: Left arm) 111 97.7  F (36.5  C) (Oral) 16 1.88 m (6' 2\") 126.3 kg (278 lb 8 oz)    Pulse Oximetry BMI (Body Mass Index)                98% 35.76 kg/m2          MyChart Information     [facility] lets you send messages to your doctor, view your test results, renew your prescriptions, schedule appointments and more. To sign up, go to [email or web address] . Click on \"Log in\" on the left side of the screen, which will take you to the Welcome " "page. Then click on \"Sign up Now\" on the right side of the page.     You will be asked to enter the access code listed below, as well as some personal information. Please follow the directions to create your username and password.     Your access code is: [email or web address]  Expires: 2019  9:02 PM     Your access code will  in 90 days. If you need help or a new code, please call your [facility] clinic or [phone].        Care EveryWhere ID     This is your Care EveryWhere ID. This could be used by other organizations to access your [facility] medical records  [id number]        Equal Access to Services     : Ildefonso Lopez, ronnie white, valeria nettles, meño fuentes. So [facility] [phone].    ATENCIÓN: Si habla español, tiene a torres disposición servicios gratuitos de asistencia lingüística. Llame al [phone].    We comply with applicable federal civil rights laws and [place] laws. We do not discriminate on the basis of race, color, national origin, age, disability, sex, sexual orientation, or gender identity.               Review of your medicines      UNREVIEWED medicines. Ask your doctor about these medicines        Dose / Directions    insulin detemir 100 UNIT/ML injection   Commonly known as:  LEVEMIR FLEXPEN/FLEXTOUCH        Dose:  20 Units   Inject 20 Units Subcutaneous At Bedtime   Quantity:  15 mL   Refills:  1         START taking        Dose / Directions    insulin glargine 100 UNIT/ML injection   Commonly known as:  LANTUS   Used for:  Uncontrolled insulin dependent diabetes mellitus (H)        Dose:  32 Units   Inject 32 Units Subcutaneous every morning   Quantity:  1000 mL   Refills:  3       metFORMIN 500 MG tablet   Commonly known as:  GLUCOPHAGE        Dose:  500 mg   Take 1 tablet (500 mg) by mouth 2 times daily (with meals)   Quantity:  60 tablet   Refills:  3         CONTINUE these medicines which may have CHANGED, or " have new prescriptions. If we are uncertain of the size of tablets/capsules you have at home, strength may be listed as something that might have changed.        Dose / Directions    * insulin aspart 100 UNIT/ML injection   Commonly known as:  NovoLOG PEN   This may have changed:  additional instructions        Do not take bedtime correction Insulin if BG less than 200.       For  - 224 take 1 units.       For  - 249 take 2 units.       For  - 274 take 3 units.       For  - 299 take 4 units.       For  - 324 take 5 units.       For  - 349 take 6 units.       For BG greater than or equal to 350 give 7 units.   Quantity:  1000 mL   Refills:  3       * insulin aspart 100 UNIT/ML injection   Commonly known as:  NovoLOG PEN   This may have changed:  You were already taking a medication with the same name, and this prescription was added. Make sure you understand how and when to take each.        Dose:  12 Units   Inject 12 Units Subcutaneous 3 times daily (with meals) 12 units per meal.  Work on eliminating snacks or having low/no carb snacks.   Quantity:  1000 mL   Refills:  3       * Notice:  This list has 2 medication(s) that are the same as other medications prescribed for you. Read the directions carefully, and ask your doctor or other care provider to review them with you.      CONTINUE these medicines which have NOT CHANGED        Dose / Directions    cyclobenzaprine 10 MG tablet   Commonly known as:  FLEXERIL        Dose:  10 mg   Take 1 tablet (10 mg) by mouth 3 times daily as needed for muscle spasms   Quantity:  90 tablet   Refills:  1            Where to get your medicines      These medications were sent to [facility] - [place], [place] - [address]  [address] [facility] [address]     Phone:  [phone]     insulin aspart 100 UNIT/ML injection    insulin glargine 100 UNIT/ML injection    metFORMIN 500 MG tablet         Some of these  will need a paper prescription and others can be bought over the counter. Ask your nurse if you have questions.     Bring a paper prescription for each of these medications     insulin aspart 100 UNIT/ML injection                Protect others around you: Learn how to safely use, store and throw away your medicines at www.disposemymeds.org.             Medication List: This is a list of all your medications and when to take them. Check marks below indicate your daily home schedule. Keep this list as a reference.      Medications           Morning Afternoon Evening Bedtime As Needed    cyclobenzaprine 10 MG tablet   Commonly known as:  FLEXERIL   Take 1 tablet (10 mg) by mouth 3 times daily as needed for muscle spasms                                * insulin aspart 100 UNIT/ML injection   Commonly known as:  NovoLOG PEN   Do not take bedtime correction Insulin if BG less than 200.       For  - 224 take 1 units.       For  - 249 take 2 units.       For  - 274 take 3 units.       For  - 299 take 4 units.       For  - 324 take 5 units.       For  - 349 take 6 units.       For BG greater than or equal to 350 give 7 units.   Last time this was given:  22 Units on 11/17/2018 11:49 AM                                * insulin aspart 100 UNIT/ML injection   Commonly known as:  NovoLOG PEN   Inject 12 Units Subcutaneous 3 times daily (with meals) 12 units per meal.  Work on eliminating snacks or having low/no carb snacks.   Last time this was given:  22 Units on 11/17/2018 11:49 AM                                insulin detemir 100 UNIT/ML injection   Commonly known as:  LEVEMIR FLEXPEN/FLEXTOUCH   Inject 20 Units Subcutaneous At Bedtime                                insulin glargine 100 UNIT/ML injection   Commonly known as:  LANTUS   Inject 32 Units Subcutaneous every morning   Last time this was given:  32 Units on 11/17/2018  9:02 AM                                metFORMIN 500 MG  tablet   Commonly known as:  GLUCOPHAGE   Take 1 tablet (500 mg) by mouth 2 times daily (with meals)   Last time this was given:  500 mg on 11/17/2018  7:44 AM                                * Notice:  This list has 2 medication(s) that are the same as other medications prescribed for you. Read the directions carefully, and ask your doctor or other care provider to review them with you.

## 2018-11-16 LAB
ALBUMIN SERPL-MCNC: 3.4 G/DL (ref 3.4–5)
ALBUMIN UR-MCNC: NEGATIVE MG/DL
ALP SERPL-CCNC: 128 U/L (ref 40–150)
ALT SERPL W P-5'-P-CCNC: 42 U/L (ref 0–70)
ANION GAP SERPL CALCULATED.3IONS-SCNC: 9 MMOL/L (ref 3–14)
APPEARANCE UR: CLEAR
AST SERPL W P-5'-P-CCNC: 32 U/L (ref 0–45)
BILIRUB SERPL-MCNC: 0.6 MG/DL (ref 0.2–1.3)
BILIRUB UR QL STRIP: NEGATIVE
BUN SERPL-MCNC: 24 MG/DL (ref 7–30)
CALCIUM SERPL-MCNC: 8.7 MG/DL (ref 8.5–10.1)
CHLORIDE SERPL-SCNC: 97 MMOL/L (ref 94–109)
CO2 SERPL-SCNC: 25 MMOL/L (ref 20–32)
COLOR UR AUTO: ABNORMAL
CREAT SERPL-MCNC: 1.56 MG/DL (ref 0.66–1.25)
GFR SERPL CREATININE-BSD FRML MDRD: 48 ML/MIN/1.7M2
GLUCOSE BLD-MCNC: 346 MG/DL (ref 70–99)
GLUCOSE BLDC GLUCOMTR-MCNC: 164 MG/DL (ref 70–99)
GLUCOSE BLDC GLUCOMTR-MCNC: 214 MG/DL (ref 70–99)
GLUCOSE BLDC GLUCOMTR-MCNC: 241 MG/DL (ref 70–99)
GLUCOSE BLDC GLUCOMTR-MCNC: 241 MG/DL (ref 70–99)
GLUCOSE BLDC GLUCOMTR-MCNC: 248 MG/DL (ref 70–99)
GLUCOSE BLDC GLUCOMTR-MCNC: 259 MG/DL (ref 70–99)
GLUCOSE BLDC GLUCOMTR-MCNC: 270 MG/DL (ref 70–99)
GLUCOSE BLDC GLUCOMTR-MCNC: 272 MG/DL (ref 70–99)
GLUCOSE BLDC GLUCOMTR-MCNC: 308 MG/DL (ref 70–99)
GLUCOSE BLDC GLUCOMTR-MCNC: 330 MG/DL (ref 70–99)
GLUCOSE BLDC GLUCOMTR-MCNC: 331 MG/DL (ref 70–99)
GLUCOSE BLDC GLUCOMTR-MCNC: 352 MG/DL (ref 70–99)
GLUCOSE BLDC GLUCOMTR-MCNC: 438 MG/DL (ref 70–99)
GLUCOSE BLDC GLUCOMTR-MCNC: 47 MG/DL (ref 70–99)
GLUCOSE BLDC GLUCOMTR-MCNC: 478 MG/DL (ref 70–99)
GLUCOSE BLDC GLUCOMTR-MCNC: 478 MG/DL (ref 70–99)
GLUCOSE BLDC GLUCOMTR-MCNC: 510 MG/DL (ref 70–99)
GLUCOSE BLDC GLUCOMTR-MCNC: 77 MG/DL (ref 70–99)
GLUCOSE SERPL-MCNC: 682 MG/DL (ref 70–99)
GLUCOSE UR STRIP-MCNC: >1000 MG/DL
HBA1C MFR BLD: 10.9 % (ref 0–5.6)
HGB UR QL STRIP: NEGATIVE
INTERPRETATION ECG - MUSE: NORMAL
KETONES UR STRIP-MCNC: NEGATIVE MG/DL
LEUKOCYTE ESTERASE UR QL STRIP: NEGATIVE
MUCOUS THREADS #/AREA URNS LPF: PRESENT /LPF
NITRATE UR QL: NEGATIVE
PH UR STRIP: 5.5 PH (ref 5–7)
POTASSIUM SERPL-SCNC: 5.3 MMOL/L (ref 3.4–5.3)
PROT SERPL-MCNC: 7.1 G/DL (ref 6.8–8.8)
RBC #/AREA URNS AUTO: <1 /HPF (ref 0–2)
SODIUM SERPL-SCNC: 131 MMOL/L (ref 133–144)
SOURCE: ABNORMAL
SP GR UR STRIP: 1.03 (ref 1–1.03)
TRANS CELLS #/AREA URNS HPF: <1 /HPF (ref 0–1)
TROPONIN I SERPL-MCNC: <0.015 UG/L (ref 0–0.04)
UROBILINOGEN UR STRIP-MCNC: NORMAL MG/DL (ref 0–2)
WBC #/AREA URNS AUTO: <1 /HPF (ref 0–5)

## 2018-11-16 PROCEDURE — 99220 ZZC INITIAL OBSERVATION CARE,LEVL III: CPT | Mod: Z6 | Performed by: NURSE PRACTITIONER

## 2018-11-16 PROCEDURE — 00000146 ZZHCL STATISTIC GLUCOSE BY METER IP

## 2018-11-16 PROCEDURE — 83516 IMMUNOASSAY NONANTIBODY: CPT | Performed by: PHYSICIAN ASSISTANT

## 2018-11-16 PROCEDURE — 25000132 ZZH RX MED GY IP 250 OP 250 PS 637: Performed by: PHYSICIAN ASSISTANT

## 2018-11-16 PROCEDURE — 96365 THER/PROPH/DIAG IV INF INIT: CPT | Performed by: EMERGENCY MEDICINE

## 2018-11-16 PROCEDURE — 36415 COLL VENOUS BLD VENIPUNCTURE: CPT | Performed by: PHYSICIAN ASSISTANT

## 2018-11-16 PROCEDURE — 96366 THER/PROPH/DIAG IV INF ADDON: CPT

## 2018-11-16 PROCEDURE — 25000125 ZZHC RX 250: Performed by: EMERGENCY MEDICINE

## 2018-11-16 PROCEDURE — 96372 THER/PROPH/DIAG INJ SC/IM: CPT | Mod: 59

## 2018-11-16 PROCEDURE — 96366 THER/PROPH/DIAG IV INF ADDON: CPT | Performed by: EMERGENCY MEDICINE

## 2018-11-16 PROCEDURE — 25000128 H RX IP 250 OP 636: Performed by: EMERGENCY MEDICINE

## 2018-11-16 PROCEDURE — 25000131 ZZH RX MED GY IP 250 OP 636 PS 637: Performed by: PHYSICIAN ASSISTANT

## 2018-11-16 PROCEDURE — 81001 URINALYSIS AUTO W/SCOPE: CPT | Performed by: EMERGENCY MEDICINE

## 2018-11-16 PROCEDURE — 84681 ASSAY OF C-PEPTIDE: CPT | Performed by: PHYSICIAN ASSISTANT

## 2018-11-16 PROCEDURE — G0378 HOSPITAL OBSERVATION PER HR: HCPCS

## 2018-11-16 PROCEDURE — 82947 ASSAY GLUCOSE BLOOD QUANT: CPT | Performed by: PHYSICIAN ASSISTANT

## 2018-11-16 PROCEDURE — 25800025 ZZH RX 258: Performed by: EMERGENCY MEDICINE

## 2018-11-16 RX ORDER — ACETAMINOPHEN 650 MG/1
650 SUPPOSITORY RECTAL EVERY 4 HOURS PRN
Status: DISCONTINUED | OUTPATIENT
Start: 2018-11-16 | End: 2018-11-16

## 2018-11-16 RX ORDER — DEXTROSE MONOHYDRATE 25 G/50ML
25-50 INJECTION, SOLUTION INTRAVENOUS
Status: DISCONTINUED | OUTPATIENT
Start: 2018-11-16 | End: 2018-11-17 | Stop reason: HOSPADM

## 2018-11-16 RX ORDER — NICOTINE POLACRILEX 4 MG
15-30 LOZENGE BUCCAL
Status: DISCONTINUED | OUTPATIENT
Start: 2018-11-16 | End: 2018-11-16

## 2018-11-16 RX ORDER — ONDANSETRON 2 MG/ML
4 INJECTION INTRAMUSCULAR; INTRAVENOUS EVERY 6 HOURS PRN
Status: DISCONTINUED | OUTPATIENT
Start: 2018-11-16 | End: 2018-11-16

## 2018-11-16 RX ORDER — DEXTROSE MONOHYDRATE 25 G/50ML
25-50 INJECTION, SOLUTION INTRAVENOUS
Status: DISCONTINUED | OUTPATIENT
Start: 2018-11-16 | End: 2018-11-16

## 2018-11-16 RX ORDER — METOCLOPRAMIDE HYDROCHLORIDE 5 MG/ML
10 INJECTION INTRAMUSCULAR; INTRAVENOUS EVERY 6 HOURS PRN
Status: DISCONTINUED | OUTPATIENT
Start: 2018-11-16 | End: 2018-11-16

## 2018-11-16 RX ORDER — ACETAMINOPHEN 325 MG/1
650 TABLET ORAL ONCE
Status: DISCONTINUED | OUTPATIENT
Start: 2018-11-16 | End: 2018-11-17 | Stop reason: HOSPADM

## 2018-11-16 RX ORDER — ACETAMINOPHEN 650 MG/1
650 SUPPOSITORY RECTAL EVERY 4 HOURS PRN
Status: DISCONTINUED | OUTPATIENT
Start: 2018-11-16 | End: 2018-11-17 | Stop reason: HOSPADM

## 2018-11-16 RX ORDER — NICOTINE POLACRILEX 4 MG
15-30 LOZENGE BUCCAL
Status: DISCONTINUED | OUTPATIENT
Start: 2018-11-16 | End: 2018-11-17 | Stop reason: HOSPADM

## 2018-11-16 RX ORDER — ONDANSETRON 2 MG/ML
4 INJECTION INTRAMUSCULAR; INTRAVENOUS EVERY 6 HOURS PRN
Status: DISCONTINUED | OUTPATIENT
Start: 2018-11-16 | End: 2018-11-17 | Stop reason: HOSPADM

## 2018-11-16 RX ORDER — LIDOCAINE 40 MG/G
CREAM TOPICAL
Status: DISCONTINUED | OUTPATIENT
Start: 2018-11-16 | End: 2018-11-16

## 2018-11-16 RX ORDER — METOCLOPRAMIDE HYDROCHLORIDE 5 MG/ML
10 INJECTION INTRAMUSCULAR; INTRAVENOUS EVERY 6 HOURS PRN
Status: DISCONTINUED | OUTPATIENT
Start: 2018-11-16 | End: 2018-11-17 | Stop reason: HOSPADM

## 2018-11-16 RX ORDER — ACETAMINOPHEN 325 MG/1
650 TABLET ORAL EVERY 4 HOURS PRN
Status: DISCONTINUED | OUTPATIENT
Start: 2018-11-16 | End: 2018-11-17 | Stop reason: HOSPADM

## 2018-11-16 RX ORDER — LIDOCAINE 40 MG/G
CREAM TOPICAL
Status: DISCONTINUED | OUTPATIENT
Start: 2018-11-16 | End: 2018-11-17 | Stop reason: HOSPADM

## 2018-11-16 RX ORDER — ACETAMINOPHEN 325 MG/1
650 TABLET ORAL EVERY 4 HOURS PRN
Status: DISCONTINUED | OUTPATIENT
Start: 2018-11-16 | End: 2018-11-16

## 2018-11-16 RX ADMIN — SODIUM CHLORIDE 1000 ML: 9 INJECTION, SOLUTION INTRAVENOUS at 00:10

## 2018-11-16 RX ADMIN — INSULIN GLARGINE 20 UNITS: 100 INJECTION, SOLUTION SUBCUTANEOUS at 13:10

## 2018-11-16 RX ADMIN — HUMAN INSULIN: 100 INJECTION, SOLUTION SUBCUTANEOUS at 08:02

## 2018-11-16 RX ADMIN — HUMAN INSULIN 5.5 UNITS/HR: 100 INJECTION, SOLUTION SUBCUTANEOUS at 00:22

## 2018-11-16 RX ADMIN — INSULIN ASPART 10 UNITS: 100 INJECTION, SOLUTION INTRAVENOUS; SUBCUTANEOUS at 10:10

## 2018-11-16 RX ADMIN — METFORMIN HYDROCHLORIDE 500 MG: 500 TABLET ORAL at 18:08

## 2018-11-16 RX ADMIN — INSULIN ASPART 4 UNITS: 100 INJECTION, SOLUTION INTRAVENOUS; SUBCUTANEOUS at 13:03

## 2018-11-16 RX ADMIN — DEXTROSE AND SODIUM CHLORIDE 1000 ML: 5; 450 INJECTION, SOLUTION INTRAVENOUS at 02:08

## 2018-11-16 ASSESSMENT — PAIN DESCRIPTION - DESCRIPTORS: DESCRIPTORS: ACHING

## 2018-11-16 NOTE — PROGRESS NOTES
Observation Unit Progress Note    Date of Encounter: 11/16/18    Observation Staff Physician: Dr Reilly    Reason for Admission: Hyperglycemia      Observation Goals:  - Blood Glucose greater than 70 less than 250 on two consecutive readings.  - Ketones absent from urine.  - Tolerating oral intake to maintain hydration  - Safe disposition plan has been identified    Plan of care for shift:  Assessment/Plan:  Kar Up Jr. is a 48 year old male with a history of HTN, insulin dependent diabetes, hyperlipedemia, who presents to the ED today with hyperglycemia.     1. Hyperglycemia:C/o blurry vision, urinary frequency, polydipsia. Pt has been noncompliant with his diabetes, getting all of his medication refills from ED since moving to the area in Sept. Reports he does not have insurance. Patient was started on an insulin drip in the ED and was transitioned per recommendations of Endocrine. Recommended restart metformin 500mg BID with supper glargine 20 units given at 1300. aspart for meals and snacks: 1unit/10g CHO ordered. Endocrine will determine fixed dose at discharge based on meal insulin requirements in hospital. Patient was seen by social work who set patient up with [facility] on Monday.  BG today: [phone]- [phone] 0826- [phone]   - continue metformin 500mg BID   -continue glargine 20 units    aspart for meals and snacks: 1unit/10g CHO ordered  -monitor glucose ac, hs and 0200  - Per Endocrine, add on C-peptid as patient reports DKA episodes.     #Chronic Medical Problems  #HTN- pt reports he takes something for this but has no idea what.     Disposition:  1. Home tomorrow based on Endocrine final recommendations.     Exam:  GENERAL APPEARANCE:  A/O x4. NAD.  SKIN: Clean, dry, and intact without visible lesions, rash, jaundice, cyanosis, erythema, ecchymoses to exposed areas.  HEENT: NCAT w/out masses, lesions, or  abnormalities. Sclera anicteric, PERRLA, EOMI.  Oral mucosa pink and moist without erythema, exudate, lesions, ulcerations, or thrush. Teeth and gums normal.    NECK: Supple, no masses. No jugular venous distention.   CARDIOVASCULAR: +Bilateral lower extremity edema. S1, S2 RRR. No murmurs, rubs, or gallops. Distal pulses intact.  RESPIRATORY: Respiratory effort WNL. CTA  bilaterally without crackles/rales/wheeze   ABDOMEN: Active BS in all 4 quadrants. Abdomen soft and non-tender. No masses or hepatosplenomegaly.  MUSCULOSKELETAL: Gait is steady. Strength 5/5 in major muscle groups of bilateral UE and LE.  Extremities normal, no gross deformities noted, non-tender to palpation.   NEURO: CN II-XII grossly intact. Speech normal. Appropriate throughout interview. Sensation grossly WNL.   HEME/LYMPH: No visible bleeding.  PSYCHIATRIC: Mentation and affect appear normal

## 2018-11-16 NOTE — CONSULTS
Diabetes/Hyperglycemia Management Consult    Chief Complaint uncontrolled insulin dependent diabetes  Consult requested by: Alesha Galindo PA-C, attending: Dr. Miranda Jimenez  History of Present Illness Mr. Kar Up is a 47 yo man with a history of insulin dependent diabetes, HTN, and hyperlipidemia, who was admitted to the observation unit on 11/15/18 with persistent hyperglycemia.    Kar reports that he was diagnosed with diabetes about 20 years ago.  He does not recall if antibodies were checked at that time.  He presented with dehydration when he was first diagnosed, but does not recall being in diabetic ketoacidosis.  He says that he has been told he has type 2 diabetes and was started on glipizide, but within a few months was switched to insulin b/c of high glucoses. Up until last spring he was taking NovoLog 70/30 mixture insulin 85 units twice daily, metformin 1000 mg twice daily.  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  He lost about 40 pounds.  He was not checking his blood sugars most of this time, and says that he was feeling fine.  At one point he even stopped taking his insulin.  When he would take his mixture insulin his glucoses will drop below.  So at some point in the spring he was switched to Levemir 20 units, aspart 5 units with meals, metformin 500 mg daily.  Because of lower glucoses he stopped taking the metformin altogether at that time.  It is uncertain how compliant he was with taking the Levemir and NovoLog.  He says that his glucoses were initially good, but over the last few months they have been running much higher. His family moved from [place] to the [facility] this fall. On review of his glucometer it appears that glucoses started running high since the end of October, however, glucose checks prior to this were scanty.  Glucose readings in the past month have ranged 300 to high (greater than 600).  He says  that he was taking his insulin regularly at this time it just did not seem to bring down the blood sugars at all.  He was seen in the emergency department for hyperglycemia on November 3.  At that time there was concern that his insulin was no longer good, as it had not been refrigerated in some time.  He was given fresh prescriptions and a phone number to call to establish care with a PCP.  He lost the phone number, and says that that glucoses are still high despite the new insulin.    On admission Kevin glucose was 682, his bicarb was within normal limits as was his anion gap.  His creatinine is 1.56, GFR 58.  He was started on IV insulin on admission and overnight his glucose rapidly came down.  With insulin rates ranging 2.5-7 units/h his glucose dropped to 47 at 8 AM.  IV insulin was stopped at that time and no basal or meal insulin was given.  After breakfast his glucose rapidly key to 510 by 10 a.m.  He was given 10 units of aspart and his glucose has gradually come down to 308.  He was started on his home dose of basal insulin: Glargine 20 units at 1300.    Kar reports that after losing weight last spring he gradually gained back about 40 pounds in the last couple months with being more sedentary.  He does not keep a specific diet.  He admits that he does drink a lot of soda but lately he is trying to switch to V8 splash (which contains fruit juice).  Over the last month he has had excessive thirst and excessive urination.  He recently started a maintenance job and reports that his activity level is a little bit higher.      Recent Labs  Lab 11/16/18  1301 11/16/18  1203 11/16/18  1103 11/16/18  1008 11/16/18  0846 11/16/18  0824  11/15/18  2322   GLC  --   --   --   --   --   --   --  682*   * 438* 478* 510* 259* 77  < >  --    < > = values in this interval not displayed.      Diabetes Type:  Insulin dependent diabetes- initially diagnosed as type 2, pt does not recall ever having antibodies  or Cpeptide checked.  Diabetes Duration: ~20 years per pt  Usual Diabetes Regimen:   Levemir 20 units at bedtime  aspart 5 units with meals  Can go long stretches with no glucose checks (several days to weeks), lately checking more frequently b/c of high glucoses.  Does not follow a specific diet but is trying to reduce soda consumption.  Ability to [place] Prescribed Regimen: uncertain  Diabetes Control:   Lab Results   Component Value Date    A1C 10.9 11/15/2018     Diabetes Complications: peripheral neuropathy, no known nephropathy but creatinine is elevated this admission  History of DKA: Pt reports 3 admissions for high glucoses (possible DKA) in the past 20 years  Able to Detect Hypoglycemia: usually with BG 70 or less, but if sedentary less consistent hypoglycemia awareness  Usual Diabetes Care Provider: none- just moved from [place] to [facility]  Factors Impacting Glucose Control: uncertain adherence to insulin regimen, no carb restriction with diet, discontinuation of metformin outpatient, fluctuating activity level in past 6+months with changing jobs.      Review of Systems  10 point ROS completed with pertinent positives and negatives noted in the HPI    Past medical, family and social histories are reviewed and updated.    Past Medical History  Past Medical History:   Diagnosis Date     Diabetes (H)      Hyperlipidemia      Hypertension        Family History  Diabetes- both parents (pt not sure what kind, both required insulin and he is not sure if they started on oral meds).    Social History  Social History     Social History     Marital status:      Spouse name: N/A     Number of children: N/A     Years of education: N/A     Social History Main Topics     Smoking status: Current Every Day Smoker     Packs/day: 1.50     Smokeless tobacco: Never Used     Alcohol use No     Drug use: No     Sexual activity: Not Asked     Other Topics Concern     None     Social History Narrative  "    Kar is  and has children, he and his wife and young son moved up to the [facility] from [place] recently.  He is working a maintenance job.    Physical Exam  /88 (BP Location: Left arm)  Pulse 111  Temp 97.8  F (36.6  C) (Oral)  Resp 16  Ht 1.88 m (6' 2\")  Wt 126.3 kg (278 lb 8 oz)  SpO2 100%  BMI 35.76 kg/m2    General:  pleasant man, resting in bed, in no distress. Son and wife are present.  HEENT: NC/AT, PER and anicteric, non-injected, oral mucous membranes moist.   Lungs: unlabored respiration, no cough  Skin: warm and dry, no obvious lesions  MSK:  fluid movement of all extremities  Lymp:  no LE edema   Mental status:  alert, oriented x3, communicating clearly  Psych:  calm, even mood    Laboratory  Recent Labs   Lab Test  11/15/18   2322  11/03/18   0055   NA  131*  132*   POTASSIUM  5.3  4.8   CHLORIDE  97  98   CO2  25  25   ANIONGAP  9  8   GLC  682*  648*   BUN  24  19   CR  1.56*  1.37*   CONNIE  8.7  8.6     CBC RESULTS:   Recent Labs   Lab Test  11/15/18   2322   WBC  8.0   RBC  5.18   HGB  15.9   HCT  46.2   MCV  89   MCH  30.7   MCHC  34.4   RDW  12.5   PLT  163       Liver Function Studies -   Recent Labs   Lab Test  11/15/18   2322   PROTTOTAL  7.1   ALBUMIN  3.4   BILITOTAL  0.6   ALKPHOS  128   AST  32   ALT  42       Active Medications  Current Facility-Administered Medications   Medication     acetaminophen (TYLENOL) Suppository 650 mg     acetaminophen (TYLENOL) tablet 650 mg     acetaminophen (TYLENOL) tablet 650 mg     dextrose 5% and 0.45% NaCl infusion     dextrose 50 % injection 25-50 mL     glucose gel 15-30 g    Or     dextrose 50 % injection 25-50 mL    Or     glucagon injection 1 mg     insulin aspart (NovoLOG) inj (RAPID ACTING)     insulin aspart (NovoLOG) inj (RAPID ACTING)     insulin aspart (NovoLOG) inj (RAPID ACTING)     insulin aspart (NovoLOG) inj (RAPID ACTING)     insulin glargine (LANTUS) injection 20 Units     lidocaine (LMX4) cream     " lidocaine 1 % 1 mL     metoclopramide (REGLAN) injection 10 mg     ondansetron (ZOFRAN) injection 4 mg     prochlorperazine (COMPAZINE) injection 10 mg     sodium chloride (PF) 0.9% PF flush 3 mL     sodium chloride (PF) 0.9% PF flush 3 mL     No current outpatient prescriptions on file.       Current Diet    Active Diet Order      High Consistent CHO Diet      Assessment  Mr. Kar Up is a 49 yo man with a history of insulin dependent diabetes, HTN, and hyperlipidemia, who was admitted to the observation unit on 11/15/18 with persistent hyperglycemia.    Fluctuating insulin needs outpatient possibly related to variable physical activity and weight loss/gain over the past 6-8 mo.  Uncertain adherence to insulin regimen at home.  New basal insulin need unknown: variable IV insulin rates overnight.  Now hyperglycemic after IV insulin was stopped (for hypoglycemia) and no basal or meal insulin given.      Plan  -restart metformin 500mg BID with supper  -glargine 20 units given at 1300  -aspart for meals and snacks: 1unit/10g CHO ordered to start with lunch today. We will determine fixed dose at discharge based on meal insulin requirements in hospital.  -aspart for correction: continue medium intensity correction ac and hs  -monitor glucose ac, hs and 0200  -will order Cpeptide with blood glucose and ARI antibody (given insulin sensitivity and pt's reported history of DKA episodes).    We will continue to follow.    SHELLY has set up appointment to establish with PCP on Nov 19 at [facility].    ADDENDUM: BG at 1630 is 330- not much improved.  Will order an additional 6 units glargine x 1, aspart is increased to 1unit/7g CHO and high intensity starting with supper.  Correction aspart dose ordered for 0200 if BG >200 ( in addition to AC and HS).    Sol Anguiano PA-C [phone]  Diabetes Management Team Job Code [id number]  I spent a total of 80 minutes bedside and on the inpatient unit managing the glycemic care of Kar  Jeovanny Angel. Over 50% of my time on the unit was spent counseling the patient and/or coordinating care regarding uncontrolled diabetes with acute hyperglycemia.  See note for details.

## 2018-11-16 NOTE — ED NOTES
[facility] [place]   ED Nurse to Floor Handoff     Kar Up Jr. is a 48 year old male who speaks English and lives with a spouse,  in a home  They arrived in the ED by car from home    ED Chief Complaint: Hyperglycemia    ED Dx;   Final diagnoses:   Uncontrolled insulin dependent diabetes mellitus (H)         Needed?: No    Allergies:   Allergies   Allergen Reactions     Bactrim [Sulfamethoxazole W/Trimethoprim] Blisters   .  Past Medical Hx:   Past Medical History:   Diagnosis Date     Diabetes (H)      Hyperlipidemia      Hypertension       Baseline Mental status: WDL  Current Mental Status changes: at basesline    Infection present or suspected this encounter: no  Sepsis suspected: No  Isolation type: No active isolations     Activity level - Baseline/Home:  Independent  Activity Level - Current:   Independent    Bariatric equipment needed?: No    In the ED these meds were given:   Medications   dextrose 5% and 0.45% NaCl infusion (not administered)   dextrose 50 % injection 25-50 mL (not administered)   insulin 1 unit/1 mL in NS (NovoLIN, HumuLIN Regular) drip -ED DKA algorithm (5.5 Units/hr Intravenous New Bag 11/16/18 0022)   0.9% sodium chloride BOLUS (1,000 mLs Intravenous New Bag 11/16/18 0010)       Drips running?  Yes    Home pump  No    Current LDAs  Peripheral IV 11/15/18 Right Lower forearm (Active)   Site Assessment WDL 11/15/2018 11:32 PM   Line Status Saline locked 11/15/2018 11:32 PM   Phlebitis Scale 0-->no symptoms 11/15/2018 11:32 PM   Infiltration Scale 0 11/15/2018 11:32 PM   Infiltration Site Treatment Method  None 11/15/2018 11:32 PM   Extravasation? No 11/15/2018 11:32 PM   Dressing Intervention New dressing  11/15/2018 11:32 PM   Number of days:1       Labs results:   Labs Ordered and Resulted from Time of ED Arrival Up to the Time of Departure from the ED   COMPREHENSIVE METABOLIC PANEL - Abnormal; Notable for the following:        Result  Value    Sodium 131 (*)     Glucose 682 (*)     Creatinine 1.56 (*)     GFR Estimate 48 (*)     GFR Estimate If Black 58 (*)     All other components within normal limits   HEMOGLOBIN A1C - Abnormal; Notable for the following:     Hemoglobin A1C 10.9 (*)     All other components within normal limits   GLUCOSE BY METER - Abnormal; Notable for the following:     Glucose >600 (*)     All other components within normal limits   GLUCOSE MONITOR NURSING POCT   CBC WITH PLATELETS DIFFERENTIAL   KETONE BETA-HYDROXYBUTYRATE QUANTITATIVE   TROPONIN I   ROUTINE UA WITH MICROSCOPIC REFLEX TO CULTURE   GLUCOSE MONITOR NURSING POCT   CARDIAC CONTINUOUS MONITORING   NOTIFY PHYSICIAN   PERIPHERAL IV CATHETER   IV ACCESS   GLUCOSE BY METER POCT       Imaging Studies: No results found for this or any previous visit (from the past 24 hour(s)).    Recent vital signs:   BP (!) 145/95  Pulse 111  Temp 98.7  F (37.1  C) (Oral)  Resp 11  SpO2 97%    Cardiac Rhythm: Normal Sinus  Pt needs tele? Yes  Skin/wound Issues: None    Code Status: Full Code    Pain control: pt had none    Nausea control: pt had none    Abnormal labs/tests/findings requiring intervention: Glucose on insulin drip    Family present during ED course? Yes   Family Comments/Social Situation comments: wife and son    Tasks needing completion: None    Ana Serna, RN  asc-- [phone] [facility]  [phone] [facility] ED

## 2018-11-16 NOTE — PLAN OF CARE
Problem: Patient Care Overview  Goal: Plan of Care/Patient Progress Review   Observation goals Start: 11/16/18 0900, PRIOR TO DISCHARGE, Routine       Comments: List all goals to be met before discharge home:   - Blood Glucose greater than 70 less than 250 on two consecutive readings. NO  - Ketones absent from urine.   - Tolerating oral intake to maintain hydration Yes  - Safe disposition plan has been identified NO  - Nurse to notify provider when observation goals have been met and patient is ready for discharge.         Blood sugar fluctuates from 164, 47,77 259 and ate breakfast after that, patient is on sliding scale, and will continue to monitor.

## 2018-11-16 NOTE — PLAN OF CARE
Problem: Patient Care Overview  Goal: Plan of Care/Patient Progress Review   ate breakfast before, talked to NP and 10 units was given

## 2018-11-16 NOTE — PROGRESS NOTES
Patient arrived on 6 D around 0430 with spouse and child. Patient alert and oriented. Denies pain. On insulin pump and blood glucose checked Q 1 hr. Last BG around 07 was 169 and drip changed to 3 units on algorithm #2. Admission profile not done due to patient not being able to stay awake. Oncoming RN informed.

## 2018-11-16 NOTE — CONSULTS
"Diabetes Education  Received consult request to see this 48 year old male for diabetes education review.  Patient on observation unit following presentation to ED with hyperglycemia.  He recently moved to [place] from [place], and has not yet established primary care.  Upon admission, his glucose was over 600 mg/dL.  Met with patient to assess learning needs.  Patient states he uses insulin pens, and the 4 mm 32 gauge insulin pen needles.  He uses his abdomen for injections, and rotates sites.  He uses Levemir for his long-acting insulin, and NovoLog for his meal coverage.  He has a [employer] brand (Relion) blood glucose monitor, and is concerned that it may not be calibrated.  Did provided him with an Accu-chek Guide monitor kit.  Provided him with a \"Simple Pay\" card to help with cost of test strips ($19.99 for 50 strips, $29.99 for 100 strips).  Patient appreciative of the new monitor kit.    When discharged, will need prescriptions for:  1.  Accu-chek Guide test strips  2.  Accu-chek FastClix lancet drums  3.  B-D insulin pen needles (4mm, 32 gauge)    Kaelyn Daily MS, APRN, CNS, CDE, CDTC  [phone]      "

## 2018-11-16 NOTE — PLAN OF CARE
Problem: Patient Care Overview  Goal: Plan of Care/Patient Progress Review  Outpatient/Observation goals to be met before discharge home:      Comments: List all goals to be met before discharge home:   - Blood Glucose greater than 70 less than 250 on two consecutive readings: Not yet.   - Ketones absent from urine: Yes  - Tolerating oral intake to maintain hydration :Yes  - Safe disposition plan has been identified: No   - Nurse to notify provider when observation goals have been met and patient is ready to be discharged.

## 2018-11-16 NOTE — PROGRESS NOTES
"BP (!) 149/93 (BP Location: Left arm)  Pulse 111  Temp 97.9  F (36.6  C) (Oral)  Resp 16  Ht 1.88 m (6' 2\")  Wt 126.3 kg (278 lb 8 oz)  SpO2 99%  BMI 35.76 kg/m2      OBSERVATION GOALS:   - Blood Glucose greater than 70 less than 250 on two consecutive readings: GOAL NOT MET. BG greater than 300  - Ketones absent from urine. GOAL MET: Negative for ketones since 0027 on 11/16  - Tolerating oral intake to maintain hydration: GOAL MET: Tolerating oral intake, denies nausea.   - Safe disposition plan has been identified: GOAL NOT MET: Control BG levels not defined, still adjusting need for insulin. Diabetic team following.   "

## 2018-11-16 NOTE — ED TRIAGE NOTES
"Pt presents to ED with hyperglycemia. Pt states his BS reader at home said \"High\". Pt reports having a cold recently. Pt endorses fatigue, blurred vision and dry mouth.   "

## 2018-11-16 NOTE — ED PROVIDER NOTES
"  History     Chief Complaint   Patient presents with     Hyperglycemia     The history is provided by the patient.     Kar Up Jr. is a 48 year old male with a history of HTN, insulin dependent diabetes, hyperlipedemia, who presents to the ED today with hyperglycemia.  He says that his blood sugars have been reading \"high\" for 2-3 days.  He is taking his insulin as directed, including novolog 5 units before meals and levemir 20 units at bedtime. C/o blurry vision, urinary frequency, polydipsia.  Denies chest pain, SOB, abd pain, n/v/d.  No fevers or chills.  He is complaining of fatigue and generally feels unwell.    He moved to [place] in September and states that he does not have a primary clinic, despite the fact that he has been in the emergency department now on 3 occasions in the past month.  Previously he was told to follow-up and establish care with a clinic, patient states that he must have lost those instructions and that phone number.      I have reviewed the Medications, Allergies, Past Medical and Surgical History, and Social History in the [facility] system.  Past Medical History:   Diagnosis Date     Diabetes (H)      Hyperlipidemia      Hypertension        History reviewed. No pertinent surgical history.    No family history on file.    Social History   Substance Use Topics     Smoking status: Current Every Day Smoker     Packs/day: 1.50     Smokeless tobacco: Never Used     Alcohol use No       Current Facility-Administered Medications   Medication     dextrose 5% and 0.45% NaCl infusion     dextrose 50 % injection 25-50 mL     insulin 1 unit/1 mL in NS (NovoLIN, HumuLIN Regular) drip -ED DKA algorithm     Current Outpatient Prescriptions   Medication     cyclobenzaprine (FLEXERIL) 10 MG tablet     insulin aspart (NOVOLOG FLEXPEN) 100 UNIT/ML injection     insulin detemir (LEVEMIR FLEXPEN/FLEXTOUCH) 100 UNIT/ML injection        Allergies   Allergen Reactions     Bactrim [Sulfamethoxazole " W/Trimethoprim] Blisters       Review of Systems   Constitutional: Negative for chills and fever.   Eyes: Positive for visual disturbance (blurry).   Respiratory: Negative for shortness of breath.    Cardiovascular: Negative for chest pain.   Gastrointestinal: Negative for diarrhea, nausea and vomiting.   Endocrine: Positive for polydipsia and polyuria.   Genitourinary: Positive for frequency.   All other systems reviewed and are negative.      Physical Exam   BP: 116/65  Pulse: 111  Heart Rate: 95  Temp: 98.7  F (37.1  C)  Resp: 14  SpO2: 94 %      Physical Exam   Constitutional: He appears well-developed and well-nourished. No distress.   -American male, sitting up in bed.  Appears to feel unwell.  No acute distress   HENT:   Head: Normocephalic and atraumatic.   Mouth/Throat: Oropharynx is clear and moist. No oropharyngeal exudate.   Eyes: Pupils are equal, round, and reactive to light. No scleral icterus.   Cardiovascular: Normal heart sounds and intact distal pulses.    No murmur heard.  Minimally tachycardic   Pulmonary/Chest: Effort normal and breath sounds normal. No respiratory distress. He has no wheezes. He has no rales.   Abdominal: Soft. Bowel sounds are normal. He exhibits no distension. There is no tenderness. There is no rebound.   Obese, soft, nontender   Musculoskeletal: He exhibits edema. He exhibits no tenderness.   B LE edema   Skin: Skin is warm and dry. No rash noted. He is not diaphoretic.   Nursing note and vitals reviewed.      ED Course     ED Course     Procedures             EKG Interpretation:      Interpreted by Carol Castro  Time reviewed: 2310  Symptoms at time of EKG: none   Rhythm: sinus tach  Rate: 102  Axis: normal  Ectopy: none  Conduction: normal  ST Segments/ T Waves: No ST-T wave changes  Q Waves: none  Comparison to prior: No old EKG available    Clinical Impression: sinus tachycardia      Critical Care time:  none             Results for orders placed or  performed during the hospital encounter of 11/15/18 (from the past 48 hour(s))   Glucose by meter   Result Value Ref Range    Glucose >600 (HH) 70 - 99 mg/dL   EKG 12 lead   Result Value Ref Range    Interpretation ECG Click View Image link to view waveform and result    CBC with platelets differential   Result Value Ref Range    WBC 8.0 4.0 - 11.0 10e9/L    RBC Count 5.18 4.4 - 5.9 10e12/L    Hemoglobin 15.9 13.3 - 17.7 g/dL    Hematocrit 46.2 40.0 - 53.0 %    MCV 89 78 - 100 fl    MCH 30.7 26.5 - 33.0 pg    MCHC 34.4 31.5 - 36.5 g/dL    RDW 12.5 10.0 - 15.0 %    Platelet Count 163 150 - 450 10e9/L    Diff Method Automated Method     % Neutrophils 64.4 %    % Lymphocytes 27.9 %    % Monocytes 6.4 %    % Eosinophils 0.9 %    % Basophils 0.3 %    % Immature Granulocytes 0.1 %    Nucleated RBCs 0 0 /100    Absolute Neutrophil 5.2 1.6 - 8.3 10e9/L    Absolute Lymphocytes 2.2 0.8 - 5.3 10e9/L    Absolute Monocytes 0.5 0.0 - 1.3 10e9/L    Absolute Eosinophils 0.1 0.0 - 0.7 10e9/L    Absolute Basophils 0.0 0.0 - 0.2 10e9/L    Abs Immature Granulocytes 0.0 0 - 0.4 10e9/L    Absolute Nucleated RBC 0.0    Comprehensive metabolic panel   Result Value Ref Range    Sodium 131 (L) 133 - 144 mmol/L    Potassium 5.3 3.4 - 5.3 mmol/L    Chloride 97 94 - 109 mmol/L    Carbon Dioxide 25 20 - 32 mmol/L    Anion Gap 9 3 - 14 mmol/L    Glucose 682 (HH) 70 - 99 mg/dL    Urea Nitrogen 24 7 - 30 mg/dL    Creatinine 1.56 (H) 0.66 - 1.25 mg/dL    GFR Estimate 48 (L) >60 mL/min/1.7m2    GFR Estimate If Black 58 (L) >60 mL/min/1.7m2    Calcium 8.7 8.5 - 10.1 mg/dL    Bilirubin Total 0.6 0.2 - 1.3 mg/dL    Albumin 3.4 3.4 - 5.0 g/dL    Protein Total 7.1 6.8 - 8.8 g/dL    Alkaline Phosphatase 128 40 - 150 U/L    ALT 42 0 - 70 U/L    AST 32 0 - 45 U/L   Ketone Beta-Hydroxybutyrate Quantitative   Result Value Ref Range    Ketone Quantitative 0.1 0.0 - 0.6 mmol/L   Hemoglobin A1c   Result Value Ref Range    Hemoglobin A1C 10.9 (H) 0 - 5.6 %  "  Troponin I   Result Value Ref Range    Troponin I ES <0.015 0.000 - 0.045 ug/L   UA with Microscopic reflex to Culture   Result Value Ref Range    Color Urine Light Yellow     Appearance Urine Clear     Glucose Urine >1000 (A) NEG^Negative mg/dL    Bilirubin Urine Negative NEG^Negative    Ketones Urine Negative NEG^Negative mg/dL    Specific Gravity Urine 1.027 1.003 - 1.035    Blood Urine Negative NEG^Negative    pH Urine 5.5 5.0 - 7.0 pH    Protein Albumin Urine Negative NEG^Negative mg/dL    Urobilinogen mg/dL Normal 0.0 - 2.0 mg/dL    Nitrite Urine Negative NEG^Negative    Leukocyte Esterase Urine Negative NEG^Negative    Source Midstream Urine     WBC Urine <1 0 - 5 /HPF    RBC Urine <1 0 - 2 /HPF    Transitional Epi <1 0 - 1 /HPF    Mucous Urine Present (A) NEG^Negative /LPF   Glucose by meter   Result Value Ref Range    Glucose 478 (H) 70 - 99 mg/dL              Assessments & Plan (with Medical Decision Making)   This is a 48 year old male with a history of insulin-dependent diabetes who presents to the Emergency Department today with critically high glucose readings.  Patient reports that his glucose meter has been reading \"high\" for the past couple of days.  He has had some dry mouth, blurry vision, and has been feeling very fatigued as well.  He claims he does not have a clinic to follow-up with, he moved here to [place] just a couple of months ago.  I see that he was seen in the Emergency Department for this exact same thing on November 3 at which point he was given prescriptions for long acting and short acting insulin.  He states that he must have lost to the instructions to follow-up with the clinic.    Upon my evaluation in the ED, he appears very tired and fatigued.  He is and is not in any distress.  He is afebrile.  Differential diagnosis of hyperglycemia could include any type of physiologic stress.  Poor compliance with insulin is always a possibility, though he reports he is taking it " "faithfully.  Infection is possible.  ACS would be possible.  Accu-Chek here in the emergency department was \"high \".  We did establish IV access and we did draw blood for laboratory analysis.  CBC within normal limits, CMP demonstrates normal electrolytes with the exception of mild hyponatremia with a sodium of 131 (which is consistent with the pseudohyponatremia of hyperglycemia), normal bicarb, normal anion gap, creatinine of 1.56 (the only other available creatinine in our system for comparison was 1.37 approximately 10 days ago).  Serum ketones were normal at 0.1, hemoglobin A1c elevated at 10.9, indicating significantly suboptimal control.  Troponin negative and UA demonstrates glucosuria without any evidence of infection.  We did do an EKG as well which is without any evidence of ischemia or ectopy.     We did give the patient IV fluids and started an insulin drip.  As he is not acidotic and not ketotic, I believe it is reasonable to continue with insulin drip and place him in the ED observation unit.  I have spoken with the obs unit MARYCRUZ.    This part of the medical record was transcribed by Hima Jha Medical Scribe, from a dictation done by Carol Castro MD.    I have reviewed the nursing notes.    I have reviewed the findings, diagnosis, plan and need for follow up with the patient.    New Prescriptions    No medications on file       Final diagnoses:   Uncontrolled insulin dependent diabetes mellitus (H)   Renal insufficiency     ISaida, am serving as a trained medical scribe to document services personally performed by Carol Castro MD, based on the provider's statements to me.   Carol MERCEDES MD, was physically present and have reviewed and verified the accuracy of this note documented by Saida Chisholm.    11/15/2018   [facility], EMERGENCY DEPARTMENT     Carol Castro MD  11/16/18 0200    "

## 2018-11-16 NOTE — H&P
"ED OBSERVATION HISTORY & PHYSICAL    Admission Date: 11-16-18  Attending Physician: Stephane Varela MD  Admitting Physician: Carol Castro MD  NP/PA: Melissa Millan CNP    REASON FOR ADMISSION:   Chief Complaint   Patient presents with     Hyperglycemia       HPI:    Per ED, \"Kar Up Jr. is a 48 year old male with a history of HTN, insulin dependent diabetes, hyperlipedemia, who presents to the ED today with hyperglycemia.  He says that his blood sugars have been reading \"high\" for 2-3 days.  He is taking his insulin as directed, including novolog 5 units before meals and levemir 20 units at bedtime. C/o blurry vision, urinary frequency, polydipsia.  Denies chest pain, SOB, abd pain, n/v/d.  No fevers or chills.  He is complaining of fatigue and generally feels unwell.     He moved to [place] in September and states that he does not have a primary clinic, despite the fact that he has been in the emergency department now on 3 occasions in the past month.  Previously he was told to follow-up and establish care with a clinic, patient states that he must have lost those instructions and that phone number.\"    In the ED /65, , temp 98.7, RR 14, 94% on RA. Labs drawn, including troponin. EKG completed.     On admission to the observation unit the patient was stable.    ROS:    CONSTITUTIONAL: Denies fever, chills, sweats, fatigue, weakness, weight loss, or appetite changes.  SKIN: Denies rash, itching, bruising, new lumps or bumps, ecchymosis, hair changes or nail changes.  EYES: +Visual changes (blurry).  Denies double vision, or eye pain  EARS/NOSE/THROAT: Denies hearing loss, tinnitus, sinus pressure/drainage, PND, nasal congestion, runny nose, epistaxis, sore throat/mouth pain, change in taste, ear pain, bleeding gums, or hoarseness.  RESPIRATORY: Denies dyspnea at rest or with activity, cough, or hemoptysis.  CARDIOVASCULAR: Denies palpitations, chest pain/pressure, orthopnea, edema or open areas " on extremities.  GASTROINTESTINAL: Denies dysphagia, heartburn, nausea, vomiting, abdominal pain, constipation, or diarrhea.  GENITOURINARY: +Frequency   Denies dysuria, urgency, hesitancy, hematuria, or incontinence  ENDOCRINE: +polydipsia, polyuria  MUSCULOSKELETAL: Denies muscle/joint pain and weakness  NEUROLOGIC: Denies headaches, dizziness, numbness or tingling of hands and feet, confusion, memory changes, lightheadedness/dizziness or difficulties with balance.  PSYCHIATRIC: Denies anxiety, depression, mental status changes, or change in mood.  HEME/LYMPH: Denies active bleeding, swollen nodes  VASCULAR ACCESS: Denies pain, redness, or discharge.    ROS negative other than the symptoms noted above.    History:    Past Medical History:   Diagnosis Date     Diabetes (H)      Hyperlipidemia      Hypertension        History reviewed. No pertinent surgical history.    No family history on file.    Social History     Social History     Marital status:      Spouse name: N/A     Number of children: N/A     Years of education: N/A     Occupational History     Not on file.     Social History Main Topics     Smoking status: Current Every Day Smoker     Packs/day: 1.50     Smokeless tobacco: Never Used     Alcohol use No     Drug use: No     Sexual activity: Not on file     Other Topics Concern     Not on file     Social History Narrative         No current facility-administered medications on file prior to encounter.   Current Outpatient Prescriptions on File Prior to Encounter:  cyclobenzaprine (FLEXERIL) 10 MG tablet Take 1 tablet (10 mg) by mouth 3 times daily as needed for muscle spasms   insulin aspart (NOVOLOG FLEXPEN) 100 UNIT/ML injection 5 units before breakfast, 5 units before lunch, 5 units before dinner   insulin detemir (LEVEMIR FLEXPEN/FLEXTOUCH) 100 UNIT/ML injection Inject 20 Units Subcutaneous At Bedtime       Exam:  Vitals:  B/P: 134/82, T: 98.7, P: 111, R: 19    All vital signs were  reviewed.  GENERAL APPEARANCE:  A/O x4. NAD.  SKIN: Clean, dry, and intact without visible lesions, rash, jaundice, cyanosis, erythema, ecchymoses to exposed areas.  HEENT: NCAT w/out masses, lesions, or abnormalities. Sclera anicteric, PERRLA, EOMI.  Oral mucosa pink and moist without erythema, exudate, lesions, ulcerations, or thrush. Teeth and gums normal.    NECK: Supple, no masses. No jugular venous distention.   CARDIOVASCULAR: +Bilateral lower extremity edema. S1, S2 RRR. No murmurs, rubs, or gallops. Distal pulses intact.  RESPIRATORY: Respiratory effort WNL. CTA  bilaterally without crackles/rales/wheeze   ABDOMEN: Active BS in all 4 quadrants. Abdomen soft and non-tender. No masses or hepatosplenomegaly.  MUSCULOSKELETAL: Gait is steady. Strength 5/5 in major muscle groups of bilateral UE and LE.  Extremities normal, no gross deformities noted, non-tender to palpation.   NEURO: CN II-XII grossly intact. Speech normal. Appropriate throughout interview. Sensation grossly WNL.   HEME/LYMPH: No visible bleeding.  PSYCHIATRIC: Mentation and affect appear normal  VASCULAR ACCESS: CDI without erythema or discharge. Non-tender.    Data:    Results for orders placed or performed during the hospital encounter of 11/15/18   CBC with platelets differential   Result Value Ref Range    WBC 8.0 4.0 - 11.0 10e9/L    RBC Count 5.18 4.4 - 5.9 10e12/L    Hemoglobin 15.9 13.3 - 17.7 g/dL    Hematocrit 46.2 40.0 - 53.0 %    MCV 89 78 - 100 fl    MCH 30.7 26.5 - 33.0 pg    MCHC 34.4 31.5 - 36.5 g/dL    RDW 12.5 10.0 - 15.0 %    Platelet Count 163 150 - 450 10e9/L    Diff Method Automated Method     % Neutrophils 64.4 %    % Lymphocytes 27.9 %    % Monocytes 6.4 %    % Eosinophils 0.9 %    % Basophils 0.3 %    % Immature Granulocytes 0.1 %    Nucleated RBCs 0 0 /100    Absolute Neutrophil 5.2 1.6 - 8.3 10e9/L    Absolute Lymphocytes 2.2 0.8 - 5.3 10e9/L    Absolute Monocytes 0.5 0.0 - 1.3 10e9/L    Absolute Eosinophils 0.1 0.0 -  0.7 10e9/L    Absolute Basophils 0.0 0.0 - 0.2 10e9/L    Abs Immature Granulocytes 0.0 0 - 0.4 10e9/L    Absolute Nucleated RBC 0.0    Comprehensive metabolic panel   Result Value Ref Range    Sodium 131 (L) 133 - 144 mmol/L    Potassium 5.3 3.4 - 5.3 mmol/L    Chloride 97 94 - 109 mmol/L    Carbon Dioxide 25 20 - 32 mmol/L    Anion Gap 9 3 - 14 mmol/L    Glucose 682 (HH) 70 - 99 mg/dL    Urea Nitrogen 24 7 - 30 mg/dL    Creatinine 1.56 (H) 0.66 - 1.25 mg/dL    GFR Estimate 48 (L) >60 mL/min/1.7m2    GFR Estimate If Black 58 (L) >60 mL/min/1.7m2    Calcium 8.7 8.5 - 10.1 mg/dL    Bilirubin Total 0.6 0.2 - 1.3 mg/dL    Albumin 3.4 3.4 - 5.0 g/dL    Protein Total 7.1 6.8 - 8.8 g/dL    Alkaline Phosphatase 128 40 - 150 U/L    ALT 42 0 - 70 U/L    AST 32 0 - 45 U/L   Ketone Beta-Hydroxybutyrate Quantitative   Result Value Ref Range    Ketone Quantitative 0.1 0.0 - 0.6 mmol/L   Hemoglobin A1c   Result Value Ref Range    Hemoglobin A1C 10.9 (H) 0 - 5.6 %   Troponin I   Result Value Ref Range    Troponin I ES <0.015 0.000 - 0.045 ug/L   UA with Microscopic reflex to Culture   Result Value Ref Range    Color Urine Light Yellow     Appearance Urine Clear     Glucose Urine >1000 (A) NEG^Negative mg/dL    Bilirubin Urine Negative NEG^Negative    Ketones Urine Negative NEG^Negative mg/dL    Specific Gravity Urine 1.027 1.003 - 1.035    Blood Urine Negative NEG^Negative    pH Urine 5.5 5.0 - 7.0 pH    Protein Albumin Urine Negative NEG^Negative mg/dL    Urobilinogen mg/dL Normal 0.0 - 2.0 mg/dL    Nitrite Urine Negative NEG^Negative    Leukocyte Esterase Urine Negative NEG^Negative    Source Midstream Urine     WBC Urine <1 0 - 5 /HPF    RBC Urine <1 0 - 2 /HPF    Transitional Epi <1 0 - 1 /HPF    Mucous Urine Present (A) NEG^Negative /LPF   Glucose by meter   Result Value Ref Range    Glucose >600 (HH) 70 - 99 mg/dL   Glucose by meter   Result Value Ref Range    Glucose 478 (H) 70 - 99 mg/dL   Glucose by meter   Result Value  Ref Range    Glucose 241 (H) 70 - 99 mg/dL   Glucose by meter   Result Value Ref Range    Glucose 241 (H) 70 - 99 mg/dL   Glucose by meter   Result Value Ref Range    Glucose 272 (H) 70 - 99 mg/dL   EKG 12 lead   Result Value Ref Range    Interpretation ECG Click View Image link to view waveform and result              EKG Interpretation:      Interpreted by Carol Castro  Time reviewed: 2310  Symptoms at time of EKG: none   Rhythm: sinus tach  Rate: 102  Axis: normal  Ectopy: none  Conduction: normal  ST Segments/ T Waves: No ST-T wave changes  Q Waves: none  Comparison to prior: No old EKG available     Clinical Impression: sinus tachycardia        Assessment/Plan:  Kar Up Jr. is a 48 year old male with a history of HTN, insulin dependent diabetes, hyperlipedemia, who presents to the ED today with hyperglycemia. Pt has been noncompliant with his diabetes, getting all of his medication refills from ED since moving to the area in Sept. Pt is tired, not in distress. He is afebrile. CBC within normal limits. CMP shows normal electrolytes other than sodium 131, normal bicarb, normal anion gap, creatinine 1.56. Serum ketons 0.1, HgbA1c 10.9. Troponin negative and UA shows glucosuria without evidence of infection. EKG negative for ischemia or ectopy.  Plan for admission to ED Observation Unit for management of his hyperglycemia. IV fluids and insulin drip started.    1. Hyperglycemia  -Admit to ED Obs Unit  -VS per routine  -Glucose checks Q1hr  -D5 half normal saline at 250ml/hr  -Zofran PRN  -CNS Diabetes consult  - consult for dispo planning  -Care coordinator to help establish primary care provider    #Chronic Medical Problems  #HTN- pt reports he takes something for this but has no idea what.     FEN:  -Diabetic diet as tolerated.  -Monitor BMP and replace electrolytes per protocol    Prophy:  -No VTE prophy as patient is up ad ted and anticipate short observation stay   -Encourage ambulation as  tolerated     Consults:   CNS Diabetes    Care coordinator    CODE STATUS:  FULL CODE      DISPOSITION: Anticipate discharge to home in less than 2 midnights      SHOBHA Middleton, CNP  Nurse Practitioner   Emergency Department Observation Unit

## 2018-11-16 NOTE — PLAN OF CARE
Problem: Patient Care Overview  Goal: Plan of Care/Patient Progress Review  BS at this time 47, insulin drip stopped and NP is aware of it, notified NP right away and we went through what has been done and she wants it to be rechecked in 10 mins, patient is alert and oriented, eating, and will be rechecked soon.  NS at TKO, and the NP is right in the room and now the BS is at 77.

## 2018-11-16 NOTE — CONSULTS
Social Work Services Progress Note    Hospital Day: 2  Date of Initial Social Work Evaluation:  N/A  Collaborated with:  Patient, pt's spouse, and MARYCRUZ    Data:  Pt is a 49 yo male admitted to observation unit for hyperglycemia.  SW and RN CC received referrals for discharge planning/pt needing a PCP.    Intervention:  SW discussed pt with RN CC and in morning rounds.  SW met with pt and pt's spouse, Otoniel ([phone]) at bedside.  As previously indicated in provider notes, pt misplaced discharge instructions with establishing care following last ED visit, and did not make appt.  SW verified pt's demographics and discussed PCP options.  Pt is currently uninsured at this time, and is in the process of applying for MA in [facility].  Pt/spouse reported that they have not yet submitted the application.  D/t lack of insurance, SW offered to schedule appointment at [facility] ([phone]) on pt's behalf.  Pt agreeable to this, and appt scheduled for Monday, 11/19 at 2:40pm.  Appt information documented in pt's AVS, and pt verbalized agreement to this time/date.  Also explained to pt that per discussion with clinic, pt can enroll in discount program at clinic, and receive assistance with applying for health insurance.  Pt denied any further questions/concerns at this time.    Assessment:  Pt appears to be having difficulty establishing care with a PCP independently, but upon appointment being scheduled for him, appears genuine about going to scheduled appointment, and denied barriers to getting there.  It appears that pt will also benefit from support enrolling in health insurance at clinic.    Plan:    Anticipated Disposition:  Home with services (PCP follow-up scheduled)    Barriers to d/c plan:  None    Follow Up:  No further SW intervention identified; please consult SHELLY as needed.    Anny Rubio, [facility]  Emergency Department /Covering 6D  Phone: [phone]  Pager: [phone]  On-call pager:  [phone] (1600 to midnight)

## 2018-11-17 VITALS
DIASTOLIC BLOOD PRESSURE: 91 MMHG | OXYGEN SATURATION: 98 % | RESPIRATION RATE: 16 BRPM | HEIGHT: 74 IN | TEMPERATURE: 97.7 F | BODY MASS INDEX: 35.74 KG/M2 | WEIGHT: 278.5 LBS | HEART RATE: 111 BPM | SYSTOLIC BLOOD PRESSURE: 134 MMHG

## 2018-11-17 LAB
ALBUMIN SERPL-MCNC: 3 G/DL (ref 3.4–5)
ALP SERPL-CCNC: 110 U/L (ref 40–150)
ALT SERPL W P-5'-P-CCNC: 32 U/L (ref 0–70)
ANION GAP SERPL CALCULATED.3IONS-SCNC: 5 MMOL/L (ref 3–14)
AST SERPL W P-5'-P-CCNC: 20 U/L (ref 0–45)
BASOPHILS # BLD AUTO: 0 10E9/L (ref 0–0.2)
BASOPHILS NFR BLD AUTO: 0.2 %
BILIRUB SERPL-MCNC: 0.4 MG/DL (ref 0.2–1.3)
BUN SERPL-MCNC: 19 MG/DL (ref 7–30)
CALCIUM SERPL-MCNC: 8.2 MG/DL (ref 8.5–10.1)
CHLORIDE SERPL-SCNC: 105 MMOL/L (ref 94–109)
CO2 SERPL-SCNC: 26 MMOL/L (ref 20–32)
CREAT SERPL-MCNC: 1.17 MG/DL (ref 0.66–1.25)
DIFFERENTIAL METHOD BLD: NORMAL
EOSINOPHIL # BLD AUTO: 0.1 10E9/L (ref 0–0.7)
EOSINOPHIL NFR BLD AUTO: 2.1 %
ERYTHROCYTE [DISTWIDTH] IN BLOOD BY AUTOMATED COUNT: 12.6 % (ref 10–15)
GFR SERPL CREATININE-BSD FRML MDRD: 66 ML/MIN/1.7M2
GLUCOSE BLDC GLUCOMTR-MCNC: 148 MG/DL (ref 70–99)
GLUCOSE BLDC GLUCOMTR-MCNC: 217 MG/DL (ref 70–99)
GLUCOSE BLDC GLUCOMTR-MCNC: 225 MG/DL (ref 70–99)
GLUCOSE BLDC GLUCOMTR-MCNC: 279 MG/DL (ref 70–99)
GLUCOSE SERPL-MCNC: 231 MG/DL (ref 70–99)
HCT VFR BLD AUTO: 45.5 % (ref 40–53)
HGB BLD-MCNC: 15.4 G/DL (ref 13.3–17.7)
IMM GRANULOCYTES # BLD: 0 10E9/L (ref 0–0.4)
IMM GRANULOCYTES NFR BLD: 0.2 %
LYMPHOCYTES # BLD AUTO: 2.3 10E9/L (ref 0.8–5.3)
LYMPHOCYTES NFR BLD AUTO: 35.6 %
MAGNESIUM SERPL-MCNC: 1.9 MG/DL (ref 1.6–2.3)
MCH RBC QN AUTO: 30.4 PG (ref 26.5–33)
MCHC RBC AUTO-ENTMCNC: 33.8 G/DL (ref 31.5–36.5)
MCV RBC AUTO: 90 FL (ref 78–100)
MONOCYTES # BLD AUTO: 0.4 10E9/L (ref 0–1.3)
MONOCYTES NFR BLD AUTO: 5.5 %
NEUTROPHILS # BLD AUTO: 3.7 10E9/L (ref 1.6–8.3)
NEUTROPHILS NFR BLD AUTO: 56.4 %
NRBC # BLD AUTO: 0 10*3/UL
NRBC BLD AUTO-RTO: 0 /100
PLATELET # BLD AUTO: 154 10E9/L (ref 150–450)
POTASSIUM SERPL-SCNC: 3.9 MMOL/L (ref 3.4–5.3)
PROT SERPL-MCNC: 6.3 G/DL (ref 6.8–8.8)
RBC # BLD AUTO: 5.07 10E12/L (ref 4.4–5.9)
SODIUM SERPL-SCNC: 136 MMOL/L (ref 133–144)
WBC # BLD AUTO: 6.5 10E9/L (ref 4–11)

## 2018-11-17 PROCEDURE — 80053 COMPREHEN METABOLIC PANEL: CPT | Performed by: PHYSICIAN ASSISTANT

## 2018-11-17 PROCEDURE — 00000146 ZZHCL STATISTIC GLUCOSE BY METER IP

## 2018-11-17 PROCEDURE — 36415 COLL VENOUS BLD VENIPUNCTURE: CPT | Performed by: PHYSICIAN ASSISTANT

## 2018-11-17 PROCEDURE — 83735 ASSAY OF MAGNESIUM: CPT | Performed by: PHYSICIAN ASSISTANT

## 2018-11-17 PROCEDURE — 99217 ZZC OBSERVATION CARE DISCHARGE: CPT | Mod: Z6 | Performed by: NURSE PRACTITIONER

## 2018-11-17 PROCEDURE — 85025 COMPLETE CBC W/AUTO DIFF WBC: CPT | Performed by: PHYSICIAN ASSISTANT

## 2018-11-17 PROCEDURE — 25000131 ZZH RX MED GY IP 250 OP 636 PS 637: Performed by: PHYSICIAN ASSISTANT

## 2018-11-17 PROCEDURE — 25000132 ZZH RX MED GY IP 250 OP 250 PS 637: Performed by: PHYSICIAN ASSISTANT

## 2018-11-17 PROCEDURE — G0378 HOSPITAL OBSERVATION PER HR: HCPCS

## 2018-11-17 PROCEDURE — 96372 THER/PROPH/DIAG INJ SC/IM: CPT

## 2018-11-17 RX ADMIN — INSULIN GLARGINE 32 UNITS: 100 INJECTION, SOLUTION SUBCUTANEOUS at 09:02

## 2018-11-17 RX ADMIN — METFORMIN HYDROCHLORIDE 500 MG: 500 TABLET ORAL at 07:44

## 2018-11-17 NOTE — PLAN OF CARE
Problem: Patient Care Overview  Goal: Plan of Care/Patient Progress Review  Outcome: No Change  - Blood Glucose greater than 70 less than 250 on two consecutive readings: PENDING: BG at 2200 was 214  - Ketones absent from urine. YES  - Tolerating oral intake to maintain hydration: YES   - Safe disposition plan has been identified: NO    Pt is A&Ox4, VSS, and tolerating a CHO diet. BG at 2200 was 214 and he was given 1 unit of novolog sliding scale, and 4 units for carbohydrates with snacks. Pt ambulating independently, wife and son at the bedside. Nurse will continue to monitor.

## 2018-11-17 NOTE — PLAN OF CARE
Problem: Patient Care Overview  Goal: Plan of Care/Patient Progress Review  Outcome: Improving  - Blood Glucose greater than 70 less than 250 on two consecutive readings: Yes, BG at 0200 was 225 and BG at 0800 217  - Ketones absent from urine. YES  - Tolerating oral intake to maintain hydration: YES   - Safe disposition plan has been identified: YES  Patient is discharged to home, and will be seen in the clinic on Monday as scheduled, stressed the importance of going to the clinic as scheduled, BS at this time is 148.

## 2018-11-17 NOTE — PLAN OF CARE
Problem: Patient Care Overview  Goal: Plan of Care/Patient Progress Review  Outcome: No Change  OBSERVATION GOALS:   - Blood Glucose greater than 70 less than 250 on two consecutive readings: NO  - Ketones absent from urine. GOAL MET: Negative for ketones since 0027 on 11/16  - Tolerating oral intake to maintain hydration: YES   - Safe disposition plan has been identified: NO

## 2018-11-17 NOTE — PROGRESS NOTES
Diabetes Consult Daily  Progress Note          Assessment/Plan:     Mr. Kar Up is a 47 yo man with a history of insulin dependent diabetes, HTN, and hyperlipidemia, who was admitted to the observation unit on 11/15/18 with persistent hyperglycemia.    BG improved to the 200s overnight.  He is missing aspart for snacks in between meals, so midday BG is now up to the 270s (following juice and bridget crackers).    Plan for hospital and home:  -glargine increased to 32 units qAM starting today -- pt needs Rx for glargine pens at discharge  -aspart for meals: 12 units per meal.  Work on eliminating snacks or having low/no carb snacks.  -aspart for correction: increased to high intensity ac and hs  (pt needs Rx for aspart pens at discharge)  -continue metformin 500mg with bfast, 500mg with supper--- increase to 1000mg BID on 11/19/18 if tolerating.  -check blood sugar before meals and bedtime.  -Cpeptide and ARI antibody are pending-- follow up on results at [facility] on Monday.    Plan for home typed out in patient instructions (AVS) for pt.     Outpatient diabetes follow up: at [facility] on Monday 11/19 to establish with PCP.  Plan discussed with patient, bedside RN, and primary team.           Interval History:     The last 24 hours progress and nursing notes reviewed.  Kar continues to feel better.  Glucoses have improved a little but still remain in the low to high 200s overnight and today.  He has been snacking in between meals at times- and not getting aspart.  After bfast today he had juice and bridget crackers, which is likely why BG is now 279 prelunch despite increases in insulin this morning.  He is tolerating metformin.  We reviewed diabetes treatment plan for home, recommended he cut back on snacks- especially those high in carbs (reviewed lower carb snack options).  Reviewed when to follow up, and emphasized that it is essential he go to appointment at [facility] so he can get  "established with a PCP.  Kar is motivated to make changes and says he will make it to the follow up appointment.  He is still going out to smoke, but took the stairs today.    PTA Regimen:  Levemir 20 units at bedtime  aspart 5 units with meals  Can go long stretches with no glucose checks (several days to weeks), lately checking more frequently b/c of high glucoses.  Does not follow a specific diet but is trying to reduce soda consumption.      Recent Labs  Lab 11/17/18  1134 11/17/18  0742 11/17/18  0604 11/17/18  0219 11/16/18  2252 11/16/18  1746 11/16/18  1641 11/16/18  1627  11/15/18  2322   GLC  --   --  231*  --   --   --  346*  --   --  682*   * 217*  --  225* 214* 331*  --  330*  < >  --    < > = values in this interval not displayed.            Review of Systems:   See interval hx          Medications:       Active Diet Order      High Consistent CHO Diet    Physical Exam:  Gen: Alert, up and about room, in NAD   HEENT: NC/AT, mucous membranes are moist  Resp: Unlabored  Neuro:oriented x3, communicating clearly  BP (!) 134/91 (BP Location: Left arm)  Pulse 111  Temp 97.7  F (36.5  C) (Oral)  Resp 16  Ht 1.88 m (6' 2\")  Wt 126.3 kg (278 lb 8 oz)  SpO2 98%  BMI 35.76 kg/m2           Data:     Lab Results   Component Value Date    A1C 10.9 11/15/2018              CBC RESULTS:   Recent Labs   Lab Test  11/17/18   0604   WBC  6.5   RBC  5.07   HGB  15.4   HCT  45.5   MCV  90   MCH  30.4   MCHC  33.8   RDW  12.6   PLT  154     Recent Labs   Lab Test  11/17/18   0604  11/16/18   1641  11/15/18   2322   NA  136   --   131*   POTASSIUM  3.9   --   5.3   CHLORIDE  105   --   97   CO2  26   --   25   ANIONGAP  5   --   9   GLC  231*  346*  682*   BUN  19   --   24   CR  1.17   --   1.56*   CONNIE  8.2*   --   8.7     Liver Function Studies -   Recent Labs   Lab Test  11/17/18   0604   PROTTOTAL  6.3*   ALBUMIN  3.0*   BILITOTAL  0.4   ALKPHOS  110   AST  20   ALT  32     No results found for: " INR      I spent a total of 35 minutes bedside and on the inpatient unit managing the glycemic care of Kar Up Jr.. Over 50% of my time on the unit was spent counseling the patient and/or coordinating care regarding glucose management and reviewing plan for home.  See note for details.    Sol Anguiano PA-C [phone]  Diabetes Management job code [id number]

## 2018-11-17 NOTE — PLAN OF CARE
Problem: Patient Care Overview  Goal: Plan of Care/Patient Progress Review  Outcome: Improving  - Blood Glucose greater than 70 less than 250 on two consecutive readings: YES: BG at 2200 was 214; BG at 0200 was 225  - Ketones absent from urine. YES  - Tolerating oral intake to maintain hydration: YES   - Safe disposition plan has been identified: NO

## 2018-11-17 NOTE — PROGRESS NOTES
S The patient is know diabetic admitted with marked hyperglycemia. He was placed on an insulin drip and rapidly corrected to the point of hypoglycemia and is now hyperglycemic again. I have seen the patient and reviewed the labs, medical records, and discussed the case and plan with MARYCRUZ and with Endocrine     O he is alert, vss. His glucose is being checked regularly and is in the chart. He feels quite well. Other labs reviewed, his creatinine is elelvated but not to the point where he would not be able to tolerated metformin    A hyperglycemia, type 2 diabetes  '  P Per endocrine with add metformin, adjust insulin per endocrine advise

## 2018-11-17 NOTE — DISCHARGE SUMMARY
Discharge Summary    Kar Up Jr. MRN# [id number]   YOB: 1970 Age: 48 year old     Date of Admission:  11/15/2018  Date of Discharge:  11/17/2018  Admitting Physician:  Miranda Jimenez MD  Discharge Physician:             Cheo Delvalle MD (Contact: [phone])  Discharging Service:  Emergency Medicine     Primary Provider: No Ref-Primary, Physician          Discharge Diagnosis:     Hyperglycemia    Insulin dependent diabetes mellitus with complications (H)    * No resolved hospital problems. *               Discharge Disposition:   Discharged to home           Condition on Discharge:   Discharge condition: Stable   Code status on discharge: Full Code           Procedures:   No procedures performed during this admission          Discharge Medications:     Current Discharge Medication List      CONTINUE these medications which have NOT CHANGED    Details   cyclobenzaprine (FLEXERIL) 10 MG tablet Take 1 tablet (10 mg) by mouth 3 times daily as needed for muscle spasms  Qty: 90 tablet, Refills: 1      insulin aspart (NOVOLOG FLEXPEN) 100 UNIT/ML injection 5 units before breakfast, 5 units before lunch, 5 units before dinner  Qty: 15 mL, Refills: 1      insulin detemir (LEVEMIR FLEXPEN/FLEXTOUCH) 100 UNIT/ML injection Inject 20 Units Subcutaneous At Bedtime  Qty: 15 mL, Refills: 1                   Consultations:   Consultation during this admission received from endocrinology             Brief History of Illness:   Kar Up Jr. is a 48 year old male who was admitted for persistent hyperglycemia. PMH of HTN, insulin dependent diabetes, and hyperlipidemia who recently been under increased stress. He had fluctuating  insulin needs outpatient possibly related to variable physical activity and weight loss/gain over the past 6-8 mo.  Uncertain adherence to insulin regimen at home and the accuracy of blood glucose value. He also had c/o blurry vision, urinary frequency, polydipsia. Pt has  "been noncompliant with his diabetes, getting all of his medication refills from ED since moving to the area in September of 2018. In addition, he does not have medical insurance.        Hospital Course:   Patient was started on an insulin drip in the ED and was transitioned per recommendations of Endocrine. Recommended restart metformin 500mg BID with supper glargine 20 units given at 1300. aspart for meals and snacks: 1unit/10g CHO ordered. Diabetes service has seen the patient and formulated his medication regimen. Blood glucose improved, which is down to 279 today but this is after patient had snack after breakfast. Patient was seen by social work who set patient up with [facility] on Monday 11/19/18.             Final Day of Progress before Discharge:       Physical Exam:  Blood pressure (!) 134/91, pulse 111, temperature 97.7  F (36.5  C), temperature source Oral, resp. rate 16, height 1.88 m (6' 2\"), weight 126.3 kg (278 lb 8 oz), SpO2 98 %.    EXAM:  Constitutional: healthy, alert and no distress   Head: Normocephalic. No masses, lesions, tenderness or abnormalities   Neck: Neck supple. No adenopathy. Thyroid symmetric, normal size,, Carotids without bruits.   ENT: ENT exam normal, no neck nodes or sinus tenderness   Cardiovascular: RRR. No murmurs, clicks gallops or rub   Respiratory: . Good diaphragmatic excursion. Lungs clear   Gastrointestinal: Abdomen soft,. BS normal. No masses, organomegaly.   : Deferred   Musculoskeletal: extremities normal- no gross deformities noted, gait normal and normal muscle tone   Skin: no suspicious lesions or rashes   Neurologic: Gait normal. Reflexes normal and symmetric. Sensation grossly WNL.   Psychiatric: mentation appears normal and affect normal/bright   Hematologic/Lymphatic/Immunologic: normal ant/post cervical, axillary, supraclavicular and inguinal          Data:  All laboratory data reviewed             Significant Results:   None  Lab Results   Component Value Date "    WBC 6.5 11/17/2018    HGB 15.4 11/17/2018    HCT 45.5 11/17/2018     11/17/2018     11/17/2018    POTASSIUM 3.9 11/17/2018    CHLORIDE 105 11/17/2018    CO2 26 11/17/2018    BUN 19 11/17/2018    CR 1.17 11/17/2018     (H) 11/17/2018    TROPI <0.015 11/15/2018    AST 20 11/17/2018    ALT 32 11/17/2018    ALKPHOS 110 11/17/2018    BILITOTAL 0.4 11/17/2018      No results found for this or any previous visit (from the past 48 hour(s)).             Pending Results:   Unresulted Labs Ordered in the Past 30 Days of this Admission     Date and Time Order Name Status Description    11/16/2018 1502 Glutamic acid decarboxylase antibody In process     11/16/2018 1502 C-peptide In process                   Discharge Instructions and Follow-Up:   --Please take your medications as following and check your blood sugar as instructed below      -Glargine 32 units qAM starting today      -Aspart for meals: 12 units per meal.  Work on eliminating snacks or having low/no carb snacks.      -Aspart for correction: increased to high intensity and hs       HIGH INSULIN RESISTANCE DOSING         Do not take bedtime correction Insulin if BG less than 200.        For  - 224 take 1 units.        For  - 249 take 2 units.        For  - 274 take 3 units.        For  - 299 take 4 units.        For  - 324 take 5 units.        For  - 349 take 6 units.        For BG greater than or equal to 350 give 7 units.        Notify your provider if glucose greater than or equal to 350 mg/dL after administration of correction dose.      -Continue metformin 500mg with bfast, 500mg with supper--- increase to 1000mg BID on 11/19/18 if tolerating.  -Check blood sugar before meals and bedtime.  -Please go to your appointment with [facility]  -Cpeptide and ARI antibody are pending-- follow up on results at [facility] on Monday.      Attestation:  Marilu Murphy, YANN  ED Observation    Time spent on patient: 45  minutes total including face to face and coordinating care time reviewing current illness, any medication changes, and the care plan for today.        This patient was discussed with the Care Team in the OBS Unit.  The patient's chart was reviewed and the patient was also seen and evaluated by me.  The plan of care was discussed and reviewed with the Care Team.  The above documentation reflects the evaluation, medical decision making and plan under my supervision.    Cheo Saenz MD, FACEP  [facility] Staff Emergency Physician

## 2018-11-17 NOTE — DISCHARGE INSTRUCTIONS
Primary Care Physician appointment scheduled for you:    Monday, 11/19/18 at 2:40pm.  Please arrive at 2:25pm.  [facility] ([facility])  [address] [place]  [address]  [phone]    Please bring hospital discharge summary with you to appointment, and any medications that you are taking.    DIABETES  Plan for home:  -Metformin 500mg with breakfast, 500mg with supper.  Increase to 1000mg with bfast, 1000mg with supper on 11/19 if tolerating.  -Lantus 32 units every morning (you can substitute Lantus with Levemir if needed)  -Novolog for meals: 12 units per meal.  Try to avoid snacking between meals.  If you need to have a snack try to have something that is lower in carbohydrates (eggs, cheese, meat, water).  -Novolog for correction based on the following sliding scales:    Sliding Scale for blood sugar checked before meals:  Do Not give Correction Insulin if Pre-Meal BG less than 140.   For Pre-Meal  - 164 give 1 unit.   For Pre-Meal  - 189 give 2 units.   For Pre-Meal  - 214 give 3 units.   For Pre-Meal  - 239 give 4 units.   For Pre-Meal  - 264 give 5 units.   For Pre-Meal  - 289 give 6 units.   For Pre-Meal  - 314 give 7 units.   For Pre-Meal  - 339 give 8 units.   For Pre-Meal  - 364 give 9 units.   For Pre-Meal BG greater than or equal to 365 give 10 units    Sliding Scale for blood sugar checked at bedtime:  Do Not give Bedtime Correction Insulin if BG less than 200.   For  - 224 give 1 units.   For  - 249 give 2 units.   For  - 274 give 3 units.   For  - 299 give 4 units.   For  - 324 give 5 units.   For  - 349 give 6 units.   For BG greater than or equal to 350 give 7 units.     -Check blood sugar before meals and bedtime    -Follow up on diabetes at [facility] on Monday 11/19/18.

## 2018-11-17 NOTE — PLAN OF CARE
Problem: Patient Care Overview  Goal: Plan of Care/Patient Progress Review  Outcome: Improving  - Blood Glucose greater than 70 less than 250 on two consecutive readings: Yes, BG at 0200 was 225 and BG at 0800 217  - Ketones absent from urine. YES  - Tolerating oral intake to maintain hydration: YES   - Safe disposition plan has been identified: NO   Patient has been up and walking in the hallway, denies pain, alert and oriented, lantus 32 units given, will recheck BG before lunch.

## 2018-11-18 LAB — GAD65 AB SER IA-ACNC: 5.4 IU/ML (ref 0–5)

## 2018-11-19 ENCOUNTER — MEDICAL CORRESPONDENCE (OUTPATIENT)
Dept: HEALTH INFORMATION MANAGEMENT | Facility: CLINIC | Age: 48
End: 2018-11-19

## 2018-11-19 LAB — C PEPTIDE SERPL-MCNC: 2.2 NG/ML (ref 0.9–6.9)

## 2018-11-30 DIAGNOSIS — R06.83 SNORING: Primary | ICD-10-CM

## 2018-12-14 ENCOUNTER — OFFICE VISIT (OUTPATIENT)
Dept: OPHTHALMOLOGY | Facility: CLINIC | Age: 48
End: 2018-12-14
Payer: MEDICAID

## 2018-12-14 DIAGNOSIS — E11.9 TYPE 2 DIABETES MELLITUS WITHOUT RETINOPATHY (H): Primary | ICD-10-CM

## 2018-12-14 DIAGNOSIS — H52.03 HYPERMETROPIA OF BOTH EYES: ICD-10-CM

## 2018-12-14 DIAGNOSIS — H40.003 GLAUCOMA SUSPECT OF BOTH EYES: ICD-10-CM

## 2018-12-14 ASSESSMENT — TONOMETRY
IOP_METHOD: ICARE
OD_IOP_MMHG: 13
OS_IOP_MMHG: 14

## 2018-12-14 ASSESSMENT — CUP TO DISC RATIO
OD_RATIO: 0.9
OS_RATIO: 0.8

## 2018-12-14 ASSESSMENT — VISUAL ACUITY
OS_SC+: -
METHOD: SNELLEN - LINEAR
OD_SC: 20/20
OS_SC: 20/20
OD_SC+: -

## 2018-12-14 ASSESSMENT — SLIT LAMP EXAM - LIDS
COMMENTS: NORMAL
COMMENTS: NORMAL

## 2018-12-14 ASSESSMENT — EXTERNAL EXAM - LEFT EYE: OS_EXAM: NORMAL

## 2018-12-14 ASSESSMENT — REFRACTION_MANIFEST
OS_CYLINDER: +0.25
OD_CYLINDER: SPHERE
OS_ADD: +1.75
OS_SPHERE: +0.75
OD_SPHERE: +0.75
OS_AXIS: 175
OD_ADD: +1.75

## 2018-12-14 ASSESSMENT — CONF VISUAL FIELD
OD_NORMAL: 1
METHOD: COUNTING FINGERS
OS_NORMAL: 1

## 2018-12-14 ASSESSMENT — EXTERNAL EXAM - RIGHT EYE: OD_EXAM: NORMAL

## 2018-12-14 NOTE — PROGRESS NOTES
History  HPI     Annual Eye Exam     In both eyes.  Characterized as blurry vision.  Severity is moderate.              Comments     Told he has open yimi glaucoma,but not taking drops currently. Using readers but vision is blurry at near. Sometimes getting headaches from eye strain.     -420 in AM (pt states he eats late at night).  Lab Results       Component                Value               Date                       A1C                      10.9                11/15/2018                July Fierro COT 12:05 PM December 14, 2018             Last edited by July Fierro on 12/14/2018 12:06 PM. (History)          Assessment/Plan  (E11.9) Type 2 diabetes mellitus without retinopathy (H)  (primary encounter diagnosis)  Comment: No retinopathy OU  Plan:  Educated patient on clinical findings and the importance of continued management with primary care physician. Continue management as directed and return to clinic in 1 year for dilated exam, or sooner, as needed.    (H52.03) Hypermetropia of both eyes  Comment: Hyperopia OU  Plan: REFRACTION [84641]         Dispensed spectacle prescription for full time wear. Educated patient on possibility of adaptation period, if symptoms do not improve return to clinic for further testing.    (H40.003) Glaucoma suspect of both eyes  Comment: Significant cupping both eyes, previously diagnosed with glaucoma and then taken off medications  Plan:  Return to clinic in 1 month for glaucoma work-up including pachymetry, gonioscopy, visual field, and OCT.    Return to clinic in 1 month for glaucoma work-up.    Complete documentation of historical and exam elements from today's encounter can  be found in the full encounter summary report (not reduplicated in this progress  note). I personally obtained the chief complaint(s) and history of present illness. I  confirmed and edited as necessary the review of systems, past medical/surgical  history, family history, social history,  and examination findings as documented by  others; and I examined the patient myself. I personally reviewed the relevant tests,  images, and reports as documented above. I formulated and edited as necessary the  assessment and plan and discussed the findings and management plan with the  patient and family.    Juan Dumont OD, FAAO

## 2018-12-31 ENCOUNTER — TELEPHONE (OUTPATIENT)
Dept: OPHTHALMOLOGY | Facility: CLINIC | Age: 48
End: 2018-12-31

## 2019-01-04 ENCOUNTER — HOSPITAL ENCOUNTER (OUTPATIENT)
Facility: CLINIC | Age: 49
Setting detail: OBSERVATION
Discharge: HOME | End: 2019-01-05
Attending: EMERGENCY MEDICINE | Admitting: EMERGENCY MEDICINE

## 2019-01-04 ENCOUNTER — APPOINTMENT (OUTPATIENT)
Dept: GENERAL RADIOLOGY | Facility: CLINIC | Age: 49
End: 2019-01-04

## 2019-01-04 DIAGNOSIS — J45.20 MILD INTERMITTENT ASTHMA, UNSPECIFIED WHETHER COMPLICATED: ICD-10-CM

## 2019-01-04 DIAGNOSIS — Z79.4 UNCONTROLLED TYPE 2 DIABETES MELLITUS WITH HYPERGLYCEMIA, WITH LONG-TERM CURRENT USE OF INSULIN (H): ICD-10-CM

## 2019-01-04 DIAGNOSIS — I10 ESSENTIAL HYPERTENSION: Primary | ICD-10-CM

## 2019-01-04 DIAGNOSIS — E11.65 UNCONTROLLED TYPE 2 DIABETES MELLITUS WITH HYPERGLYCEMIA, WITH LONG-TERM CURRENT USE OF INSULIN (H): ICD-10-CM

## 2019-01-04 PROBLEM — R73.9 HYPERGLYCEMIA: Status: ACTIVE | Noted: 2018-11-16

## 2019-01-04 LAB
ALBUMIN SERPL-MCNC: 3.7 G/DL (ref 3.4–5)
ALBUMIN UR-MCNC: NEGATIVE MG/DL
ALP SERPL-CCNC: 150 U/L (ref 40–150)
ALT SERPL W P-5'-P-CCNC: 28 U/L (ref 0–70)
ANION GAP SERPL CALCULATED.3IONS-SCNC: 8 MMOL/L (ref 3–14)
APPEARANCE UR: CLEAR
AST SERPL W P-5'-P-CCNC: 18 U/L (ref 0–45)
BASOPHILS # BLD AUTO: 0 10E9/L (ref 0–0.2)
BASOPHILS NFR BLD AUTO: 0.2 %
BILIRUB SERPL-MCNC: 0.3 MG/DL (ref 0.2–1.3)
BILIRUB UR QL STRIP: NEGATIVE
BUN SERPL-MCNC: 23 MG/DL (ref 7–30)
CALCIUM SERPL-MCNC: 8.4 MG/DL (ref 8.5–10.1)
CHLORIDE SERPL-SCNC: 98 MMOL/L (ref 94–109)
CK SERPL-CCNC: 524 U/L (ref 30–300)
CO2 SERPL-SCNC: 24 MMOL/L (ref 20–32)
COLOR UR AUTO: ABNORMAL
CREAT SERPL-MCNC: 1.17 MG/DL (ref 0.66–1.25)
DIFFERENTIAL METHOD BLD: NORMAL
EOSINOPHIL # BLD AUTO: 0.1 10E9/L (ref 0–0.7)
EOSINOPHIL NFR BLD AUTO: 0.8 %
ERYTHROCYTE [DISTWIDTH] IN BLOOD BY AUTOMATED COUNT: 12.7 % (ref 10–15)
GFR SERPL CREATININE-BSD FRML MDRD: 73 ML/MIN/{1.73_M2}
GLUCOSE BLDC GLUCOMTR-MCNC: 308 MG/DL (ref 70–99)
GLUCOSE BLDC GLUCOMTR-MCNC: 359 MG/DL (ref 70–99)
GLUCOSE SERPL-MCNC: 467 MG/DL (ref 70–99)
GLUCOSE UR STRIP-MCNC: >1000 MG/DL
HCT VFR BLD AUTO: 47.1 % (ref 40–53)
HGB BLD-MCNC: 16.1 G/DL (ref 13.3–17.7)
HGB UR QL STRIP: ABNORMAL
IMM GRANULOCYTES # BLD: 0 10E9/L (ref 0–0.4)
IMM GRANULOCYTES NFR BLD: 0.3 %
INR PPP: 0.99 (ref 0.86–1.14)
INTERPRETATION ECG - MUSE: NORMAL
KETONES BLD-SCNC: 0.1 MMOL/L (ref 0–0.6)
KETONES UR STRIP-MCNC: NEGATIVE MG/DL
LACTATE BLD-SCNC: 1.4 MMOL/L (ref 0.7–2)
LEUKOCYTE ESTERASE UR QL STRIP: NEGATIVE
LYMPHOCYTES # BLD AUTO: 2.1 10E9/L (ref 0.8–5.3)
LYMPHOCYTES NFR BLD AUTO: 20.4 %
MAGNESIUM SERPL-MCNC: 2.2 MG/DL (ref 1.6–2.3)
MCH RBC QN AUTO: 30.5 PG (ref 26.5–33)
MCHC RBC AUTO-ENTMCNC: 34.2 G/DL (ref 31.5–36.5)
MCV RBC AUTO: 89 FL (ref 78–100)
MONOCYTES # BLD AUTO: 0.8 10E9/L (ref 0–1.3)
MONOCYTES NFR BLD AUTO: 7.8 %
NEUTROPHILS # BLD AUTO: 7.4 10E9/L (ref 1.6–8.3)
NEUTROPHILS NFR BLD AUTO: 70.5 %
NITRATE UR QL: NEGATIVE
NRBC # BLD AUTO: 0 10*3/UL
NRBC BLD AUTO-RTO: 0 /100
PH UR STRIP: 5 PH (ref 5–7)
PHOSPHATE SERPL-MCNC: 3.1 MG/DL (ref 2.5–4.5)
PLATELET # BLD AUTO: 171 10E9/L (ref 150–450)
POTASSIUM SERPL-SCNC: 4 MMOL/L (ref 3.4–5.3)
PROT SERPL-MCNC: 7.5 G/DL (ref 6.8–8.8)
RBC # BLD AUTO: 5.28 10E12/L (ref 4.4–5.9)
RBC #/AREA URNS AUTO: <1 /HPF (ref 0–2)
SODIUM SERPL-SCNC: 130 MMOL/L (ref 133–144)
SOURCE: ABNORMAL
SP GR UR STRIP: 1.03 (ref 1–1.03)
TROPONIN I SERPL-MCNC: <0.015 UG/L (ref 0–0.04)
UROBILINOGEN UR STRIP-MCNC: NORMAL MG/DL (ref 0–2)
WBC # BLD AUTO: 10.5 10E9/L (ref 4–11)
WBC #/AREA URNS AUTO: 2 /HPF (ref 0–5)

## 2019-01-04 PROCEDURE — 80053 COMPREHEN METABOLIC PANEL: CPT | Performed by: EMERGENCY MEDICINE

## 2019-01-04 PROCEDURE — 85025 COMPLETE CBC W/AUTO DIFF WBC: CPT | Performed by: EMERGENCY MEDICINE

## 2019-01-04 PROCEDURE — 96361 HYDRATE IV INFUSION ADD-ON: CPT | Performed by: EMERGENCY MEDICINE

## 2019-01-04 PROCEDURE — 99219 ZZC INITIAL OBSERVATION CARE,LEVL II: CPT | Mod: Z6 | Performed by: PHYSICIAN ASSISTANT

## 2019-01-04 PROCEDURE — 84484 ASSAY OF TROPONIN QUANT: CPT | Performed by: EMERGENCY MEDICINE

## 2019-01-04 PROCEDURE — 81001 URINALYSIS AUTO W/SCOPE: CPT | Performed by: EMERGENCY MEDICINE

## 2019-01-04 PROCEDURE — 25000131 ZZH RX MED GY IP 250 OP 636 PS 637: Performed by: EMERGENCY MEDICINE

## 2019-01-04 PROCEDURE — 99285 EMERGENCY DEPT VISIT HI MDM: CPT | Mod: 25 | Performed by: EMERGENCY MEDICINE

## 2019-01-04 PROCEDURE — 82550 ASSAY OF CK (CPK): CPT | Performed by: EMERGENCY MEDICINE

## 2019-01-04 PROCEDURE — 83036 HEMOGLOBIN GLYCOSYLATED A1C: CPT | Performed by: EMERGENCY MEDICINE

## 2019-01-04 PROCEDURE — 84100 ASSAY OF PHOSPHORUS: CPT | Performed by: EMERGENCY MEDICINE

## 2019-01-04 PROCEDURE — 96374 THER/PROPH/DIAG INJ IV PUSH: CPT | Performed by: EMERGENCY MEDICINE

## 2019-01-04 PROCEDURE — 80320 DRUG SCREEN QUANTALCOHOLS: CPT | Performed by: EMERGENCY MEDICINE

## 2019-01-04 PROCEDURE — 85610 PROTHROMBIN TIME: CPT | Performed by: EMERGENCY MEDICINE

## 2019-01-04 PROCEDURE — 82010 KETONE BODYS QUAN: CPT | Performed by: EMERGENCY MEDICINE

## 2019-01-04 PROCEDURE — 83605 ASSAY OF LACTIC ACID: CPT | Performed by: EMERGENCY MEDICINE

## 2019-01-04 PROCEDURE — 96372 THER/PROPH/DIAG INJ SC/IM: CPT | Mod: XS | Performed by: EMERGENCY MEDICINE

## 2019-01-04 PROCEDURE — 00000146 ZZHCL STATISTIC GLUCOSE BY METER IP

## 2019-01-04 PROCEDURE — 25000128 H RX IP 250 OP 636: Performed by: EMERGENCY MEDICINE

## 2019-01-04 PROCEDURE — 25000128 H RX IP 250 OP 636: Performed by: PHYSICIAN ASSISTANT

## 2019-01-04 PROCEDURE — 83735 ASSAY OF MAGNESIUM: CPT | Performed by: EMERGENCY MEDICINE

## 2019-01-04 PROCEDURE — G0378 HOSPITAL OBSERVATION PER HR: HCPCS

## 2019-01-04 PROCEDURE — 71046 X-RAY EXAM CHEST 2 VIEWS: CPT

## 2019-01-04 PROCEDURE — 80307 DRUG TEST PRSMV CHEM ANLYZR: CPT | Performed by: EMERGENCY MEDICINE

## 2019-01-04 RX ORDER — NICOTINE POLACRILEX 4 MG
15-30 LOZENGE BUCCAL
Status: DISCONTINUED | OUTPATIENT
Start: 2019-01-04 | End: 2019-01-05 | Stop reason: HOSPADM

## 2019-01-04 RX ORDER — DEXTROSE MONOHYDRATE 25 G/50ML
25-50 INJECTION, SOLUTION INTRAVENOUS
Status: DISCONTINUED | OUTPATIENT
Start: 2019-01-04 | End: 2019-01-05 | Stop reason: HOSPADM

## 2019-01-04 RX ORDER — ONDANSETRON 2 MG/ML
4 INJECTION INTRAMUSCULAR; INTRAVENOUS EVERY 6 HOURS PRN
Status: DISCONTINUED | OUTPATIENT
Start: 2019-01-04 | End: 2019-01-05 | Stop reason: HOSPADM

## 2019-01-04 RX ORDER — CYCLOBENZAPRINE HCL 5 MG
10 TABLET ORAL 3 TIMES DAILY PRN
Status: DISCONTINUED | OUTPATIENT
Start: 2019-01-04 | End: 2019-01-05 | Stop reason: HOSPADM

## 2019-01-04 RX ORDER — HYDROMORPHONE HYDROCHLORIDE 1 MG/ML
0.5 INJECTION, SOLUTION INTRAMUSCULAR; INTRAVENOUS; SUBCUTANEOUS
Status: DISCONTINUED | OUTPATIENT
Start: 2019-01-04 | End: 2019-01-05 | Stop reason: HOSPADM

## 2019-01-04 RX ORDER — LIDOCAINE 40 MG/G
CREAM TOPICAL
Status: DISCONTINUED | OUTPATIENT
Start: 2019-01-04 | End: 2019-01-05 | Stop reason: HOSPADM

## 2019-01-04 RX ORDER — METOCLOPRAMIDE HYDROCHLORIDE 5 MG/ML
10 INJECTION INTRAMUSCULAR; INTRAVENOUS EVERY 6 HOURS PRN
Status: DISCONTINUED | OUTPATIENT
Start: 2019-01-04 | End: 2019-01-05 | Stop reason: HOSPADM

## 2019-01-04 RX ORDER — ROSUVASTATIN CALCIUM 20 MG/1
20 TABLET, COATED ORAL DAILY
Status: ON HOLD | COMMUNITY
Start: 2018-12-17 | End: 2019-01-05

## 2019-01-04 RX ORDER — HYDROCHLOROTHIAZIDE 12.5 MG/1
12.5 TABLET ORAL DAILY
Status: DISCONTINUED | OUTPATIENT
Start: 2019-01-05 | End: 2019-01-05

## 2019-01-04 RX ORDER — HYDROCHLOROTHIAZIDE 12.5 MG/1
12.5 CAPSULE ORAL DAILY
Status: ON HOLD | COMMUNITY
Start: 2018-12-17 | End: 2019-01-05

## 2019-01-04 RX ORDER — SODIUM CHLORIDE 9 MG/ML
1000 INJECTION, SOLUTION INTRAVENOUS CONTINUOUS
Status: DISCONTINUED | OUTPATIENT
Start: 2019-01-04 | End: 2019-01-05 | Stop reason: HOSPADM

## 2019-01-04 RX ORDER — ACETAMINOPHEN 500 MG
1000 TABLET ORAL EVERY 6 HOURS PRN
Status: DISCONTINUED | OUTPATIENT
Start: 2019-01-04 | End: 2019-01-05 | Stop reason: HOSPADM

## 2019-01-04 RX ADMIN — INSULIN ASPART 10 UNITS: 100 INJECTION, SOLUTION INTRAVENOUS; SUBCUTANEOUS at 21:45

## 2019-01-04 RX ADMIN — SODIUM CHLORIDE 1000 ML: 9 INJECTION, SOLUTION INTRAVENOUS at 00:00

## 2019-01-04 RX ADMIN — SODIUM CHLORIDE 1000 ML: 9 INJECTION, SOLUTION INTRAVENOUS at 21:45

## 2019-01-04 RX ADMIN — SODIUM CHLORIDE 1000 ML: 9 INJECTION, SOLUTION INTRAVENOUS at 20:10

## 2019-01-04 RX ADMIN — Medication 0.5 MG: at 20:10

## 2019-01-04 ASSESSMENT — ENCOUNTER SYMPTOMS
SHORTNESS OF BREATH: 0
VOMITING: 0
DIARRHEA: 0
COUGH: 0
FEVER: 0

## 2019-01-04 ASSESSMENT — MIFFLIN-ST. JEOR: SCORE: 2232.5

## 2019-01-04 NOTE — ED TRIAGE NOTES
Pt arrived in triage with concerns of abdominal cramping in his lower abdomen, and leg cramping. Pt says it is intense pain after he gave himself an insulin injection around 1300 today.

## 2019-01-05 VITALS
OXYGEN SATURATION: 93 % | RESPIRATION RATE: 18 BRPM | TEMPERATURE: 97.9 F | HEART RATE: 78 BPM | BODY MASS INDEX: 36.65 KG/M2 | WEIGHT: 285.6 LBS | HEIGHT: 74 IN | SYSTOLIC BLOOD PRESSURE: 157 MMHG | DIASTOLIC BLOOD PRESSURE: 112 MMHG

## 2019-01-05 LAB
ALBUMIN SERPL-MCNC: 3 G/DL (ref 3.4–5)
ALP SERPL-CCNC: 122 U/L (ref 40–150)
ALT SERPL W P-5'-P-CCNC: 22 U/L (ref 0–70)
AMPHETAMINES UR QL SCN: NEGATIVE
ANION GAP SERPL CALCULATED.3IONS-SCNC: 7 MMOL/L (ref 3–14)
AST SERPL W P-5'-P-CCNC: 17 U/L (ref 0–45)
BARBITURATES UR QL: NEGATIVE
BASOPHILS # BLD AUTO: 0 10E9/L (ref 0–0.2)
BASOPHILS NFR BLD AUTO: 0.3 %
BENZODIAZ UR QL: NEGATIVE
BILIRUB SERPL-MCNC: 0.4 MG/DL (ref 0.2–1.3)
BUN SERPL-MCNC: 16 MG/DL (ref 7–30)
CALCIUM SERPL-MCNC: 8.2 MG/DL (ref 8.5–10.1)
CANNABINOIDS UR QL SCN: POSITIVE
CHLORIDE SERPL-SCNC: 109 MMOL/L (ref 94–109)
CK SERPL-CCNC: 616 U/L (ref 30–300)
CO2 SERPL-SCNC: 23 MMOL/L (ref 20–32)
COCAINE UR QL: NEGATIVE
CREAT SERPL-MCNC: 1.16 MG/DL (ref 0.66–1.25)
DIFFERENTIAL METHOD BLD: NORMAL
EOSINOPHIL # BLD AUTO: 0.1 10E9/L (ref 0–0.7)
EOSINOPHIL NFR BLD AUTO: 1.2 %
ERYTHROCYTE [DISTWIDTH] IN BLOOD BY AUTOMATED COUNT: 12.8 % (ref 10–15)
ETHANOL UR QL SCN: NEGATIVE
GFR SERPL CREATININE-BSD FRML MDRD: 74 ML/MIN/{1.73_M2}
GLUCOSE BLDC GLUCOMTR-MCNC: 142 MG/DL (ref 70–99)
GLUCOSE BLDC GLUCOMTR-MCNC: 167 MG/DL (ref 70–99)
GLUCOSE BLDC GLUCOMTR-MCNC: 416 MG/DL (ref 70–99)
GLUCOSE SERPL-MCNC: 238 MG/DL (ref 70–99)
HBA1C MFR BLD: 12.3 % (ref 0–5.6)
HCT VFR BLD AUTO: 46.1 % (ref 40–53)
HGB BLD-MCNC: 15.5 G/DL (ref 13.3–17.7)
IMM GRANULOCYTES # BLD: 0 10E9/L (ref 0–0.4)
IMM GRANULOCYTES NFR BLD: 0.1 %
LYMPHOCYTES # BLD AUTO: 2.2 10E9/L (ref 0.8–5.3)
LYMPHOCYTES NFR BLD AUTO: 28.9 %
MCH RBC QN AUTO: 30.3 PG (ref 26.5–33)
MCHC RBC AUTO-ENTMCNC: 33.6 G/DL (ref 31.5–36.5)
MCV RBC AUTO: 90 FL (ref 78–100)
MONOCYTES # BLD AUTO: 0.5 10E9/L (ref 0–1.3)
MONOCYTES NFR BLD AUTO: 6.8 %
NEUTROPHILS # BLD AUTO: 4.8 10E9/L (ref 1.6–8.3)
NEUTROPHILS NFR BLD AUTO: 62.7 %
NRBC # BLD AUTO: 0 10*3/UL
NRBC BLD AUTO-RTO: 0 /100
OPIATES UR QL SCN: NEGATIVE
PLATELET # BLD AUTO: 152 10E9/L (ref 150–450)
POTASSIUM SERPL-SCNC: 4 MMOL/L (ref 3.4–5.3)
PROT SERPL-MCNC: 6.5 G/DL (ref 6.8–8.8)
RBC # BLD AUTO: 5.12 10E12/L (ref 4.4–5.9)
SODIUM SERPL-SCNC: 139 MMOL/L (ref 133–144)
WBC # BLD AUTO: 7.7 10E9/L (ref 4–11)

## 2019-01-05 PROCEDURE — 96372 THER/PROPH/DIAG INJ SC/IM: CPT

## 2019-01-05 PROCEDURE — 82550 ASSAY OF CK (CPK): CPT | Performed by: PHYSICIAN ASSISTANT

## 2019-01-05 PROCEDURE — 36415 COLL VENOUS BLD VENIPUNCTURE: CPT | Performed by: PHYSICIAN ASSISTANT

## 2019-01-05 PROCEDURE — 85025 COMPLETE CBC W/AUTO DIFF WBC: CPT | Performed by: PHYSICIAN ASSISTANT

## 2019-01-05 PROCEDURE — 40000275 ZZH STATISTIC RCP TIME EA 10 MIN

## 2019-01-05 PROCEDURE — 25000132 ZZH RX MED GY IP 250 OP 250 PS 637: Performed by: PHYSICIAN ASSISTANT

## 2019-01-05 PROCEDURE — 25000132 ZZH RX MED GY IP 250 OP 250 PS 637: Performed by: NURSE PRACTITIONER

## 2019-01-05 PROCEDURE — 94640 AIRWAY INHALATION TREATMENT: CPT

## 2019-01-05 PROCEDURE — 25000131 ZZH RX MED GY IP 250 OP 636 PS 637: Performed by: PHYSICIAN ASSISTANT

## 2019-01-05 PROCEDURE — 25000128 H RX IP 250 OP 636: Performed by: EMERGENCY MEDICINE

## 2019-01-05 PROCEDURE — G0378 HOSPITAL OBSERVATION PER HR: HCPCS

## 2019-01-05 PROCEDURE — 99217 ZZC OBSERVATION CARE DISCHARGE: CPT | Mod: Z6 | Performed by: NURSE PRACTITIONER

## 2019-01-05 PROCEDURE — 00000146 ZZHCL STATISTIC GLUCOSE BY METER IP

## 2019-01-05 PROCEDURE — 80053 COMPREHEN METABOLIC PANEL: CPT | Performed by: PHYSICIAN ASSISTANT

## 2019-01-05 PROCEDURE — 25000125 ZZHC RX 250: Performed by: NURSE PRACTITIONER

## 2019-01-05 RX ORDER — AMLODIPINE BESYLATE 10 MG/1
10 TABLET ORAL DAILY
Status: DISCONTINUED | OUTPATIENT
Start: 2019-01-05 | End: 2019-01-05 | Stop reason: HOSPADM

## 2019-01-05 RX ORDER — AMLODIPINE BESYLATE 10 MG/1
10 TABLET ORAL DAILY
Qty: 30 TABLET | Refills: 0 | Status: SHIPPED | OUTPATIENT
Start: 2019-01-06 | End: 2019-04-21

## 2019-01-05 RX ORDER — ALBUTEROL SULFATE 0.83 MG/ML
2.5 SOLUTION RESPIRATORY (INHALATION)
Status: DISCONTINUED | OUTPATIENT
Start: 2019-01-05 | End: 2019-01-05 | Stop reason: HOSPADM

## 2019-01-05 RX ORDER — NALOXONE HYDROCHLORIDE 0.4 MG/ML
.1-.4 INJECTION, SOLUTION INTRAMUSCULAR; INTRAVENOUS; SUBCUTANEOUS
Status: DISCONTINUED | OUTPATIENT
Start: 2019-01-05 | End: 2019-01-05 | Stop reason: HOSPADM

## 2019-01-05 RX ORDER — ALBUTEROL SULFATE 90 UG/1
2 AEROSOL, METERED RESPIRATORY (INHALATION) EVERY 6 HOURS
Qty: 18 G | Refills: 0 | Status: SHIPPED | OUTPATIENT
Start: 2019-01-05 | End: 2019-02-04

## 2019-01-05 RX ADMIN — AMLODIPINE BESYLATE 10 MG: 10 TABLET ORAL at 08:40

## 2019-01-05 RX ADMIN — SODIUM CHLORIDE 1000 ML: 9 INJECTION, SOLUTION INTRAVENOUS at 02:10

## 2019-01-05 RX ADMIN — ALBUTEROL SULFATE 2.5 MG: 2.5 SOLUTION RESPIRATORY (INHALATION) at 09:20

## 2019-01-05 RX ADMIN — INSULIN ASPART 7 UNITS: 100 INJECTION, SOLUTION INTRAVENOUS; SUBCUTANEOUS at 03:20

## 2019-01-05 RX ADMIN — INSULIN ASPART 2 UNITS: 100 INJECTION, SOLUTION INTRAVENOUS; SUBCUTANEOUS at 08:44

## 2019-01-05 RX ADMIN — INSULIN ASPART 1 UNITS: 100 INJECTION, SOLUTION INTRAVENOUS; SUBCUTANEOUS at 12:13

## 2019-01-05 RX ADMIN — METFORMIN HYDROCHLORIDE 1000 MG: 500 TABLET, FILM COATED ORAL at 08:40

## 2019-01-05 ASSESSMENT — MIFFLIN-ST. JEOR: SCORE: 2235.22

## 2019-01-05 NOTE — PROGRESS NOTES
Discharge instruction reviewed.  Patient verbalized understanding, diabetes management instructions understood per pt. PIV removed, patient ambulated to the main lobby accompanied by family. Patient discharged

## 2019-01-05 NOTE — ED PROVIDER NOTES
"  History     Chief Complaint   Patient presents with     Abdominal Pain     HPI  Kar Up Jr. is a 48 year old diabetic male who presents to ER with complaints of bilateral anterior leg cramping after giving himself an insulin injection in his abdominal wall earlier today.  Patient states that his glucose has been running \"high\" over the last 3 days.  Patient denies any shortness of breath, cough, vomiting, diarrhea and presents here to the ER for evaluation.  Patient denies any fevers or trauma.    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  Meanwhile the patient states that his legs have been getting more and more sore in his thighs bilaterally over the past week and he attributed it to the metformin and/or insulin.  Therefore he stopped taking his metformin.    I have reviewed the Medications, Allergies, Past Medical and Surgical History, and Social History in the [facility] system.    Past Medical History:   Diagnosis Date     Diabetes (H)      Hyperlipidemia      Hypertension      History reviewed. No pertinent surgical history.    Family History   Problem Relation Age of Onset     Glaucoma No family hx of      Macular Degeneration No family hx of      Social History     Tobacco Use     Smoking status: Current Every Day Smoker     Packs/day: 1.50     Smokeless tobacco: Never Used   Substance Use Topics     Alcohol use: No     Allergies   Allergen Reactions     Bactrim [Sulfamethoxazole W/Trimethoprim] Blisters       Dose / Directions   cyclobenzaprine 10 MG tablet  Commonly known as:  FLEXERIL      Dose:  10 mg  Take 1 tablet (10 mg) by mouth 3 times daily as needed for muscle spasms  Quantity:  90 tablet  Refills:  1     * insulin aspart 100 UNIT/ML pen  Commonly known as:  NovoLOG PEN  Used for:  Hyperglycemia      Do not take bedtime correction Insulin if BG less than 200.        For  - 224 take 1 units. " "       For  - 249 take 2 units.        For  - 274 take 3 units.        For  - 299 take 4 units.        For  - 324 take 5 units.        For  - 349 take 6 units.        For BG greater than or equal to 350 give 7 units.  Quantity:  1000 mL  Refills:  3     * insulin aspart 100 UNIT/ML pen  Commonly known as:  NovoLOG PEN  Used for:  Insulin dependent diabetes mellitus with complications (H)      Dose:  12 Units  Inject 12 Units Subcutaneous 3 times daily (with meals) 12 units per meal.  Work on eliminating snacks or having low/no carb snacks.  Quantity:  1000 mL  Refills:  3     insulin detemir 100 UNIT/ML pen  Commonly known as:  LEVEMIR FLEXPEN/FLEXTOUCH      Dose:  20 Units  Inject 20 Units Subcutaneous At Bedtime  Quantity:  15 mL  Refills:  1     insulin glargine 100 UNIT/ML pen  Commonly known as:  LANTUS PEN  Used for:  Uncontrolled insulin dependent diabetes mellitus (H)      Dose:  32 Units  Inject 32 Units Subcutaneous every morning  Quantity:  1000 mL  Refills:  3     metFORMIN 500 MG tablet  Commonly known as:  GLUCOPHAGE  Used for:  Hyperglycemia      Dose:  500 mg  Take 1 tablet (500 mg) by mouth 2 times daily (with meals)  Quantity:  60 tablet  Refills:  3         * This list has 2 medication(s) that are the same as other medications prescribed for you. Read the directions carefully, and ask your doctor or other care provider to review them with you.                Review of Systems   Constitutional: Negative for fever.   Respiratory: Negative for cough and shortness of breath.    Gastrointestinal: Negative for diarrhea and vomiting.   Musculoskeletal:        Bilateral anterior leg cramping   All other systems reviewed and are negative.    Physical Exam   BP: (!) 159/95  Pulse: 110  Heart Rate: 91  Temp: 98.6  F (37  C)  Resp: 18  Height: 188 cm (6' 2\")  Weight: 129.3 kg (285 lb)  SpO2: 97 %    Physical Exam   Constitutional: He is oriented to person, place, and time. "   Alert conversant pleasant   HENT:   Head: Atraumatic.   Eyes: EOM are normal. Pupils are equal, round, and reactive to light.   Neck: Neck supple.   Cardiovascular: Regular rhythm.   Pulmonary/Chest: He has no wheezes. He has no rales.   Abdominal: Soft. There is no tenderness.   Distended obese   Musculoskeletal:   Patient does have some tenderness of his thighs bilaterally without crepitus   Neurological: He is alert and oriented to person, place, and time.   Grossly intact and symmetric   Skin: Skin is dry.   Psychiatric: He has a normal mood and affect.       ED Course        Procedures          IV was established for blood draw and fluid administration.    EKG revealed a normal sinus rhythm at a rate of 99 with a KY interval 0.144 and a QRS duration 0.090.  The patient had a normal axis with no acute ST or T wave changes significant for ischemia.  This is read by me personally.    Results for orders placed or performed during the hospital encounter of 01/04/19   XR Chest 2 Views    Narrative    Exam: Two-view chest radiograph 1/4/2019 9:32 PM.    Indication: HYPERGLYCEMIA    Comparison: 11/3/2018    Findings: PA and lateral views of the chest are obtained. The cardiac  silhouette, mediastinum, hemidiaphragms, and costophrenic angles are  clear. Pulmonary vasculature and sagar are normal in appearance. No  airspace opacities. No pneumothorax. No acute bony abnormalities.      Impression    Impression: No acute airspace disease.     I have personally reviewed the examination and initial interpretation  and I agree with the findings.    NUNO ERICKSON MD   Glucose by meter   Result Value Ref Range    Glucose 359 (H) 70 - 99 mg/dL   CBC with platelets differential   Result Value Ref Range    WBC 10.5 4.0 - 11.0 10e9/L    RBC Count 5.28 4.4 - 5.9 10e12/L    Hemoglobin 16.1 13.3 - 17.7 g/dL    Hematocrit 47.1 40.0 - 53.0 %    MCV 89 78 - 100 fl    MCH 30.5 26.5 - 33.0 pg    MCHC 34.2 31.5 - 36.5 g/dL    RDW 12.7  10.0 - 15.0 %    Platelet Count 171 150 - 450 10e9/L    Diff Method Automated Method     % Neutrophils 70.5 %    % Lymphocytes 20.4 %    % Monocytes 7.8 %    % Eosinophils 0.8 %    % Basophils 0.2 %    % Immature Granulocytes 0.3 %    Nucleated RBCs 0 0 /100    Absolute Neutrophil 7.4 1.6 - 8.3 10e9/L    Absolute Lymphocytes 2.1 0.8 - 5.3 10e9/L    Absolute Monocytes 0.8 0.0 - 1.3 10e9/L    Absolute Eosinophils 0.1 0.0 - 0.7 10e9/L    Absolute Basophils 0.0 0.0 - 0.2 10e9/L    Abs Immature Granulocytes 0.0 0 - 0.4 10e9/L    Absolute Nucleated RBC 0.0    INR   Result Value Ref Range    INR 0.99 0.86 - 1.14   Comprehensive metabolic panel   Result Value Ref Range    Sodium 130 (L) 133 - 144 mmol/L    Potassium 4.0 3.4 - 5.3 mmol/L    Chloride 98 94 - 109 mmol/L    Carbon Dioxide 24 20 - 32 mmol/L    Anion Gap 8 3 - 14 mmol/L    Glucose 467 (H) 70 - 99 mg/dL    Urea Nitrogen 23 7 - 30 mg/dL    Creatinine 1.17 0.66 - 1.25 mg/dL    GFR Estimate 73 >60 mL/min/[1.73_m2]    GFR Estimate If Black 85 >60 mL/min/[1.73_m2]    Calcium 8.4 (L) 8.5 - 10.1 mg/dL    Bilirubin Total 0.3 0.2 - 1.3 mg/dL    Albumin 3.7 3.4 - 5.0 g/dL    Protein Total 7.5 6.8 - 8.8 g/dL    Alkaline Phosphatase 150 40 - 150 U/L    ALT 28 0 - 70 U/L    AST 18 0 - 45 U/L   Magnesium   Result Value Ref Range    Magnesium 2.2 1.6 - 2.3 mg/dL   Phosphorus   Result Value Ref Range    Phosphorus 3.1 2.5 - 4.5 mg/dL   Lactic acid whole blood   Result Value Ref Range    Lactic Acid 1.4 0.7 - 2.0 mmol/L   CK total   Result Value Ref Range    CK Total 524 (H) 30 - 300 U/L   Troponin I   Result Value Ref Range    Troponin I ES <0.015 0.000 - 0.045 ug/L   Ketone Beta-Hydroxybutyrate Quantitative   Result Value Ref Range    Ketone Quantitative 0.1 0.0 - 0.6 mmol/L   EKG 12 lead   Result Value Ref Range    Interpretation ECG Click View Image link to view waveform and result        Labs Ordered and Resulted from Time of ED Arrival Up to the Time of Departure from the ED    GLUCOSE BY METER - Abnormal; Notable for the following components:       Result Value    Glucose 359 (*)     All other components within normal limits   COMPREHENSIVE METABOLIC PANEL - Abnormal; Notable for the following components:    Sodium 130 (*)     Glucose 467 (*)     Calcium 8.4 (*)     All other components within normal limits   CK TOTAL - Abnormal; Notable for the following components:    CK Total 524 (*)     All other components within normal limits   GLUCOSE MONITOR NURSING POCT   CBC WITH PLATELETS DIFFERENTIAL   INR   MAGNESIUM   PHOSPHORUS   LACTIC ACID WHOLE BLOOD   TROPONIN I   KETONE BETA-HYDROXYBUTYRATE QUANTITATIVE   ROUTINE UA WITH MICROSCOPIC REFLEX TO CULTURE   PERIPHERAL IV CATHETER          Assessments & Plan (with Medical Decision Making)     I have reviewed the nursing notes.    Medications   sodium chloride (PF) 0.9% PF flush 3 mL (not administered)   sodium chloride (PF) 0.9% PF flush 3 mL (not administered)   0.9% sodium chloride BOLUS (0 mLs Intravenous Stopped 1/4/19 2145)     Followed by   sodium chloride 0.9% infusion (not administered)   HYDROmorphone (PF) (DILAUDID) injection 0.5 mg (0.5 mg Intravenous Given 1/4/19 2010)   0.9% sodium chloride BOLUS (1,000 mLs Intravenous New Bag 1/4/19 2145)   insulin aspart (NovoLOG) inj (RAPID ACTING) (10 Units Subcutaneous Given 1/4/19 2145)     Patient wound up with a glucose of almost 500 and received insulin.  The most likely etiology of the patient's hyperglycemia is his noncompliance with his medications.  At this time the patient will be transferred to our observation unit under protocol for rehydration and insulin treatment.    Patient's bilateral thigh pain and cramping is most likely secondary to his strenuous activity level in the last few days.    I have reviewed the findings, diagnosis, and plan with the patient.    Final diagnoses:   Uncontrolled type 2 diabetes mellitus with hyperglycemia, with long-term current use of insulin  (H)     Cheo Saenz MD    I, Eliezer Hightower, am serving as a trained medical scribe to document services personally performed by Cheo Saenz MD, based on the provider's statements to me.   I, Cheo Saenz MD, was physically present and have reviewed and verified the accuracy of this note documented by Eliezer Hightower.     1/4/2019   [facility], [place], EMERGENCY DEPARTMENT     Cheo Saenz MD  01/04/19 [id number]

## 2019-01-05 NOTE — PROGRESS NOTES
Observation Brochure and Video    Observation status based on provider's order.  Observation brochure was given. Patient/Family stated understanding. Questions answered. Information sheet left on cart for patient.  Christina Green

## 2019-01-05 NOTE — DISCHARGE INSTRUCTIONS
DIABETES  -Metformin 1000mg with breakfast, 1000mg with supper  -Lantus 40 units at bedtime  -Novolog 12 units with each meal.  If your blood sugar is still running high increase to 15 units with meals.  If still high, increase to 20 units with each meal.  -check your blood sugar every morning and every evening, eventually increase to checking before each meal.  -follow up at [facility] within 1-2 weeks.    -Set alarms on your cell phone to remind you to take insulin doses.    -Let your family help you!  If they remind you to take a dose or check your glucose remember it is because they care about you-- so go ahead and take the dose.  -If this insulin plan isn't working, don't give up! Go to [facility] and ask to switch to Novolog 70/30 mixture insulin to see if this works better for you.      PLEASE CONSIDER QUITTING SMOKING.

## 2019-01-05 NOTE — H&P
[facility], [place]    History and Physical - ED Observation Service       Date of Admission:  1/4/2019    Assessment & Plan   Kar Up Jr. is a 48 year old male admitted on 1/4/2019. He has a history of DMII (insulin dependent), HTN, HLD who presented to the ED with bilateral LE cramping today. O    1. Hyperglycemia: last couple of weeks glucose has been running in 400's to TOO HIGH TO READ on his glucometer but worse and more highs in the last 3 days. Admits to polyuria, polydipsia, polyphagia. Has been doing more strenuous work recently but keeping up with fluid intake but probably has not been eating well. Last admission to Obs on 11/15/18 for hyperglycemia Endocrine was consulted and recommended increase Glargine to 32units every morning, Aspart 12units with meals and continue Metformin 500mg BID but increase to 1000mg BID in a few days if tolerating which indeed was done. discharge on 11/17/18 and followed in [facility] several times and the last was just before [place] at which time his Lantus dose was increased from 32 units at bedtime to 40 units at bedtime due to uncontrolled glucose levels. In ED,  on presentation to 70's-90's, 's-150's/70's-90's, RR 12-19, SaO2 94-99% on RA, Temp 98.6. Labs show CMP with Na 130, Ca 8.4, glucose 467 otherwise normal LFT's, Mag, Phos. Normal lactic acid, serum ketones. CK elevated at 524. Negative Troponin I x 1. Normal CBC. UA with >1000 glucose, trace blood. INR normal. CXR negative. In the ED the patient was given 1L NS bolus, 10 units Novolog, dilaudid 0.5mg IV x 1.      Last A1c was 10.9 on 11/15/18. On Lantus 40 units at bedtime, Novolog 12 units TID ac plus sliding scale, Metformin 1000mg BID   - Continue with Lantus per home regimen  - Novolog high intensity sliding scale  - BG checks TID ac and Qhs  - Check HgbA1c  - Carbohydrate Consistent Diet  - Hypoglycemic protocol  - Endocrine consult    2. Elevated  CK:  states a few weeks ago he was awoken by severe muscle cramps and was seen by his clinic at which time his lipitor was changed to crestor. Again had muscle cramps today. . Could be related to recent strenuous work, statin and/or some dehydration though lab work today is not concerning for dehydration. Was given 1L NS in ED  - 1L NS bolus now  - NS 125ml/hr  - Hold Crestor and follow up with PCP    3. HTN: - Continue hydrochlorothiazide per home regimen     Diet: Moderate Consistent CHO Diet    DVT Prophylaxis: Low Risk/Ambulatory with no VTE prophylaxis indicated  Palomo Catheter: not present  Code Status: full    Disposition Plan   Expected discharge: Tomorrow, recommended to prior living arrangement once renal function improved and Endocrine consult completed.  Entered: MCKINLEY Vargas 01/04/2019, 11:38 PM     The patient's care was discussed with the Attending Physician, Dr. Alvarez.    MCKINLEY Vargas  [facility], [place]  Ascom: [phone]  Please see sticky note for cross cover information  ______________________________________________________________________    Chief Complaint   Muscle cramping and high glucose    History is obtained from the patient    History of Present Illness   Kar Up Jr. is a 48 year old male with a history of DMII (insulin dependent), HTN, HLD who presented to the ED with bilateral LE cramping today. Over the last couple of weeks his glucose has been running in the 400's to TOO HIGH TO READ on his glucometer but worse and more highs in the last 3 days. But also he had severe BLE muscle cramps today. He admits to polyuria, polydipsia, polyphagia. He otherwise denies any chest pain, SOB, diarrhea, nausea, lightheadedness. 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 He confirms he has been keeping up with fluid intake but probably has not been eating  well enough. He states a few weeks ago he was awoken by severe muscle cramps and was seen by his clinic at which time his lipitor was changed to crestor. Denies alcohol use, states rare. Denies illicit drug use. Admits to tobacco use ~ 1.5 ppd.     He was admitted to the Observation Unit on 11/15/18 for hyperglycemia and Endocrine was consulted. His initial glucose was 682 and was started on an insulin gtt and bottomed down to 47 by 8AM the next morning but then key quickly and peaked at 510 by 10AM after breakfast. His diabetes regimen at that time was Levemir 20 units at bedtime, Aspart 5 units with meals. A1c at that time was 10.9. After Endocrine was consulted recommendations was to restart Metformin 500mg BID, increase Glargine to 32units every morning, Aspart 12units with meals and continue Metformin 500mg BID but increase to 1000mg BID in a few days if tolerating which indeed was done. He was discharged on 11/17/18 and asked to follow up in [facility] clinic on 11/19/18. Patient reports he has been seen at the [facility] several times and the last was just before [place] at which time his Lantus dose was increased from 32 units at bedtime to 40 units at bedtime due to uncontrolled glucose levels. Admits to compliance with medications but denies regular BG checks.     In the ED,  on presentation to 70's-90's, 's-150's/70's-90's, RR 12-19, SaO2 94-99% on RA, Temp 98.6. Labs show CMP with Na 130, Ca 8.4, glucose 467 otherwise normal LFT's, Mag, Phos. Normal lactic acid, serum ketones. CK elevated at 524. Negative Troponin I x 1. Normal CBC. UA with >1000 glucose, trace blood. INR normal. CXR negative. In the ED the patient was given 1L NS bolus, 10 units Novolog, dilaudid 0.5mg IV x 1.       Review of Systems    The 10 point Review of Systems is negative other than noted in the HPI or here.     Past Medical History    I have reviewed this patient's medical history and updated it with pertinent  information if needed.   Past Medical History:   Diagnosis Date     Diabetes (H)      Hyperlipidemia      Hypertension        Past Surgical History   I have reviewed this patient's surgical history and updated it with pertinent information if needed.  History reviewed. No pertinent surgical history.    Social History   I have reviewed this patient's social history and updated it with pertinent information if needed.  Social History     Tobacco Use     Smoking status: Current Every Day Smoker     Packs/day: 1.50     Smokeless tobacco: Never Used   Substance Use Topics     Alcohol use: No     Drug use: No       Family History   I have reviewed this patient's family history and updated it with pertinent information if needed.   Family History   Problem Relation Age of Onset     Glaucoma No family hx of      Macular Degeneration No family hx of        Prior to Admission Medications   Prior to Admission Medications   Prescriptions Last Dose Informant Patient Reported? Taking?   cyclobenzaprine (FLEXERIL) 10 MG tablet   No No   Sig: Take 1 tablet (10 mg) by mouth 3 times daily as needed for muscle spasms   hydrochlorothiazide (MICROZIDE) 12.5 MG capsule   Yes No   Sig: Take 12.5 mg by mouth daily   insulin aspart (NOVOLOG PEN) 100 UNIT/ML injection   No No   Sig: Do not take bedtime correction Insulin if BG less than 200.        For  - 224 take 1 units.        For  - 249 take 2 units.        For  - 274 take 3 units.        For  - 299 take 4 units.        For  - 324 take 5 units.        For  - 349 take 6 units.        For BG greater than or equal to 350 give 7 units.   insulin aspart (NOVOLOG PEN) 100 UNIT/ML injection   No No   Sig: Inject 12 Units Subcutaneous 3 times daily (with meals) 12 units per meal.  Work on eliminating snacks or having low/no carb snacks.   insulin glargine (LANTUS SOLOSTAR) 100 UNIT/ML pen   No No   Sig: Inject 32 Units Subcutaneous every morning   metFORMIN  (GLUCOPHAGE) 500 MG tablet   No No   Sig: Take 1 tablet (500 mg) by mouth 2 times daily (with meals)   rosuvastatin (CRESTOR) 20 MG tablet   Yes No   Sig: Take 20 mg by mouth daily      Facility-Administered Medications: None     Allergies   Allergies   Allergen Reactions     Bactrim [Sulfamethoxazole W/Trimethoprim] Blisters       Physical Exam   Vital Signs: Temp: 98.6  F (37  C) Temp src: Oral BP: (!) 144/97 Pulse: 79 Heart Rate: 79 Resp: 13 SpO2: 94 % O2 Device: None (Room air)    Weight: 285 lbs 0 oz    Constitutional: awake, alert, cooperative, no apparent distress, and appears stated age  Eyes: Lids and lashes normal, pupils equal, round and reactive to light, extra ocular muscles intact, sclera clear, conjunctiva normal  ENT: Normocephalic, without obvious abnormality, atraumatic, sinuses nontender on palpation, external ears without lesions, oral pharynx with moist mucous membranes, tonsils without erythema or exudates, gums normal and good dentition.  Hematologic / Lymphatic: no cervical lymphadenopathy  Respiratory: No increased work of breathing, good air exchange, clear to auscultation bilaterally, no crackles or wheezing  Cardiovascular: Normal apical impulse, regular rate and rhythm, normal S1 and S2, no S3 or S4, and no murmur noted  GI: No scars, normal bowel sounds, soft, non-distended, non-tender, no masses palpated, no hepatosplenomegally  [id number]}  Skin: no bruising or bleeding, normal skin color, texture, turgor, no redness, warmth, or swelling, no rashes, no lesions and no abnormal moles  Musculoskeletal: There is no redness, warmth, or swelling of the joints.  Full range of motion noted.  Motor strength is 5 out of 5 all extremities bilaterally.  Tone is normal.  Neurologic: Awake, alert, oriented to name, place and time.  Cranial nerves II-XII are grossly intact.  Motor is 5 out of 5 bilaterally.  Cerebellar finger to nose, heel to shin intact.  Sensory is intact.  Babinski down going,  Romberg negative, and gait is normal.  Neuropsychiatric: General: normal, calm and normal eye contact    Data   Data reviewed today: I reviewed all medications, new labs and imaging results over the last 24 hours. I personally reviewed no images or EKG's today.    Recent Labs   Lab 01/04/19 2005   WBC 10.5   HGB 16.1   MCV 89      INR 0.99   *   POTASSIUM 4.0   CHLORIDE 98   CO2 24   BUN 23   CR 1.17   ANIONGAP 8   CONNIE 8.4*   *   ALBUMIN 3.7   PROTTOTAL 7.5   BILITOTAL 0.3   ALKPHOS 150   ALT 28   AST 18   TROPI <0.015     Most Recent 3 INR's:  Recent Labs   Lab Test 01/04/19 2005   INR 0.99     Most Recent 3 Creatinines:  Recent Labs   Lab Test 01/04/19 2005 11/17/18  0604 11/15/18  2322   CR 1.17 1.17 1.56*     Most Recent 3 Troponin's:  Recent Labs   Lab Test 01/04/19  2005 11/15/18  2322 11/03/18  0055   TROPI <0.015 <0.015 <0.015     Most Recent Cholesterol Panel:No lab results found.  Most Recent Hemoglobin A1c:  Recent Labs   Lab Test 11/15/18  2322   A1C 10.9*     Most Recent 6 glucoses:  Recent Labs   Lab Test 01/04/19 2005 11/17/18  0604 11/16/18  1641 11/15/18  2322 11/03/18  0055   * 231* 346* 682* 648*     Most Recent Urinalysis:  Recent Labs   Lab Test 01/04/19  2148   COLOR Light Yellow   APPEARANCE Clear   URINEGLC >1000*   URINEBILI Negative   URINEKETONE Negative   SG 1.026   UBLD Trace*   URINEPH 5.0   PROTEIN Negative   NITRITE Negative   LEUKEST Negative   RBCU <1   WBCU 2     Most Recent CPK:  Recent Labs   Lab Test 01/04/19 2005   *     Recent Results (from the past 24 hour(s))   XR Chest 2 Views    Narrative    Exam: Two-view chest radiograph 1/4/2019 9:32 PM.    Indication: HYPERGLYCEMIA    Comparison: 11/3/2018    Findings: PA and lateral views of the chest are obtained. The cardiac  silhouette, mediastinum, hemidiaphragms, and costophrenic angles are  clear. Pulmonary vasculature and sagar are normal in appearance. No  airspace opacities. No  pneumothorax. No acute bony abnormalities.      Impression    Impression: No acute airspace disease.     I have personally reviewed the examination and initial interpretation  and I agree with the findings.    NUNO ERICKSON MD

## 2019-01-05 NOTE — CONSULTS
Diabetes/Hyperglycemia Management Consult    Chief Complaint uncontrolled type 2 diabetes with hyperglycemia  Consult requested by: Ángel Mcfarlane PA-C, attending: Dr. Miranda Jimenez  History of Present Illness Mr. Kar Up is a 47 yo man with a history of uncontrolled insulin-dependent diabetes, HTN, and hyperlipidemia, who is admitted to the observation unit on 1/4/2019 with persistent hyperglycemia.  Mr. Up is known to our inpatient diabetes service from his previous admission to the observation unit on 11/16/18 for the same reason.  When Mr. Up discharged from that admission we recommended he take Lantus 32 units daily, aspart 12 units per meal, metformin 1000 mg twice daily, and follow-up with [facility].  He did follow-up with [facility], and eventually his glargine dose was increased from 32 units to 40 units because of persistent hyperglycemia.  His glucoses continue to run high.  He then developed cramping in his lower extremities about 2 weeks ago.  This cramping worsened over the last 2 weeks, and yesterday became unbearable and was also causing abdominal cramping.  He had been extremely thirsty for days, and his wife said that he would often drink large quantities of juice and soda.  Of note, during his last admission we discussed diet and beverage choices at length, recommending water or diet beverages.  On admission glucose was in the low 300s, but soon after key to the mid 400s.  His bicarb and anion gap are within normal range.  His sodium was slightly decreased at 130.  He received IV fluids and he also received glargine 40 units (his home dose), in addition to aspart 10 units.  By this morning his glucose was down to the 160s.    Patient admits that he is taking his glargine only about half of the time, and he has not really been taking the aspart doses at all.  He was taking metformin about 25% of the time, but he stopped taking that 2 weeks ago when he developed muscle cramps.  He was  "only checking his glucose about 2 times per week, readings were usually high 300s-400s \"when I was doing good\".  He is continued to drink regular soda and juice.  When asked what obstacles prevent him from taking his insulin doses more regularly patient states \"I just need to do it\".  Sometimes he forgets, sometimes he feels too fatigued to take the insulin.  His wife interjected that she and their young son remind him all the time to check his glucose and take his insulin, but she explains he does not usually follow through with their requests.  She says that he needs to get into a routine so that he remembers to take his insulin.  We discussed using the alarm feature on his cell phone to remind him to take insulin doses, which he seemed open to trying.  We also had a sandy conversation about the macro and micro vascular complications that can developed with uncontrolled diabetes.    We reviewed the lab results from the C-peptide and ARI antibody labs that were drawn during last admission.  His C-peptide level was within normal limits, indicating that he has endogenous insulin production, however, his ARI antibody was mildly elevated at 5.4.  We discussed that this may indicate autoimmune diabetes, and this could progress to beta cell failure.  In this case if he continues to miss insulin doses as he has been, this will lead to diabetic ketoacidosis, which can be life-threatening.    Mr. Up continues to feel tired today, but overall much better with resolution of his muscle cramping and thirst.  His appetite is at baseline.  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    Recent Labs   Lab 01/05/19  1133 01/05/19  0736 01/05/19  0658 01/05/19  0229 01/04/19  2303 01/04/19 2005 01/04/19  1803   GLC  --   --  238*  --   --  467*  --    * 167*  --  416* 308*  --  359*         Diabetes Type:  ARI antibody weakly positive (5.4) which could indicated type 1 " diabetes, but Cpeptide in normal range still.  Diabetes Duration: 20 years  Usual Diabetes Regimen:   Metformin 1000mg BID- taking about 25% of the time  glargine 40 units at bedtime- taking about 50% of time  aspart 12 units with meals- not really taking at all  Glucose monitoring: ~2x/week  Ability to Roc[place]le Prescribed Regimen: poor- despite recent admission in November, that included meeting with our team and explicit instructions on insulin doses, pt has been challenged to take doses on regular basis.    Diabetes Control:   Lab Results   Component Value Date    A1C 12.3 01/04/2019    A1C 10.9 11/15/2018     Diabetes Complications:peripheral neuropathy, no recent eye exam  Able to Detect Hypoglycemia: Often has symptoms with BG in the 100s  Usual Diabetes Care Provider: Dr. Barry at [facility]  Factors Impacting Glucose Control: difficulty adhering to prescribed regimen- pt says b/c forgetful and tired, lack of routine.  Persistent hyperglycemia is likely contributing to fatigue.      Review of Systems  10 point ROS completed with pertinent positives and negatives noted in the HPI    Past medical, family and social histories are reviewed and updated.    Past Medical History  Past Medical History:   Diagnosis Date     Diabetes (H)      Hyperlipidemia      Hypertension        Family History  Family History   Problem Relation Age of Onset     Glaucoma No family hx of      Macular Degeneration No family hx of    Insulin dependent diabetes- both parents.    Social History  Social History     Socioeconomic History     Marital status:      Spouse name: None     Number of children: None     Years of education: None     Highest education level: None   Social Needs     Financial resource strain: None     Food insecurity - worry: None     Food insecurity - inability: None     Transportation needs - medical: None     Transportation needs - non-medical: None   Occupational History     None   Tobacco Use     Smoking  "status: Current Every Day Smoker     Packs/day: 1.50     Smokeless tobacco: Never Used   Substance and Sexual Activity     Alcohol use: No     Drug use: No     Sexual activity: None   Other Topics Concern     None   Social History Narrative     None   Kar is  and has children, he and his wife and young son moved to the [facility] from [place] this past fall.  He is working a maintenance job.      Physical Exam  BP (!) 157/112 (BP Location: Right arm)   Pulse 78   Temp 97.9  F (36.6  C) (Oral)   Resp 18   Ht 1.88 m (6' 2\")   Wt 129.5 kg (285 lb 9.6 oz)   SpO2 93%   BMI 36.67 kg/m      General:  pleasant man, sitting up in bed, drowsy, in no distress. Wife and young son are present.  HEENT: NC/AT, PER and anicteric, non-injected, oral mucous membranes moist.   Lungs: unlabored respiration, no cough  ABD: central obesity  Skin: warm and dry, no obvious lesions  MSK:  fluid movement of all extremities  Lymp:  no LE edema   Mental status:  alert, oriented x3, communicating clearly  Psych:  calm, even mood    Laboratory  Recent Labs   Lab Test 01/05/19  0658 01/04/19 2005    130*   POTASSIUM 4.0 4.0   CHLORIDE 109 98   CO2 23 24   ANIONGAP 7 8   * 467*   BUN 16 23   CR 1.16 1.17   CONNIE 8.2* 8.4*     CBC RESULTS:   Recent Labs   Lab Test 01/05/19  0658   WBC 7.7   RBC 5.12   HGB 15.5   HCT 46.1   MCV 90   MCH 30.3   MCHC 33.6   RDW 12.8          Liver Function Studies -   Recent Labs   Lab Test 01/05/19  0658   PROTTOTAL 6.5*   ALBUMIN 3.0*   BILITOTAL 0.4   ALKPHOS 122   AST 17   ALT 22       Active Medications  Current Facility-Administered Medications   Medication     acetaminophen (TYLENOL) tablet 1,000 mg     albuterol (PROVENTIL) neb solution 2.5 mg     amLODIPine (NORVASC) tablet 10 mg     cyclobenzaprine (FLEXERIL) tablet 10 mg     glucose gel 15-30 g    Or     dextrose 50 % injection 25-50 mL    Or     glucagon injection 1 mg     HYDROmorphone (PF) (DILAUDID) injection " 0.5 mg     insulin aspart (NovoLOG) inj (RAPID ACTING)     insulin aspart (NovoLOG) inj (RAPID ACTING)     insulin aspart (NovoLOG) inj (RAPID ACTING)     insulin aspart (NovoLOG) inj (RAPID ACTING)     insulin glargine (LANTUS PEN) injection 40 Units     lidocaine (LMX4) cream     lidocaine 1 % 1 mL     metFORMIN (GLUCOPHAGE) tablet 1,000 mg     metoclopramide (REGLAN) injection 10 mg     naloxone (NARCAN) injection 0.1-0.4 mg     nicotine (NICORETTE) gum 2 mg     ondansetron (ZOFRAN) injection 4 mg     prochlorperazine (COMPAZINE) injection 10 mg     sodium chloride (PF) 0.9% PF flush 3 mL     sodium chloride (PF) 0.9% PF flush 3 mL     sodium chloride (PF) 0.9% PF flush 3 mL     sodium chloride (PF) 0.9% PF flush 3 mL     sodium chloride 0.9% infusion     Current Outpatient Medications   Medication Sig Dispense Refill     albuterol (PROAIR HFA/PROVENTIL HFA/VENTOLIN HFA) 108 (90 Base) MCG/ACT inhaler Inhale 2 puffs into the lungs every 6 hours 18 g 0     [START ON 1/6/2019] amLODIPine (NORVASC) 10 MG tablet Take 1 tablet (10 mg) by mouth daily 30 tablet 0       Current Diet  Orders Placed This Encounter      Moderate Consistent CHO Diet      Diet        Assessment  Mr. Kar Up is a 47 yo man with a history of uncontrolled insulin-dependent diabetes, HTN, and hyperlipidemia, who is admitted to the observation unit on 1/4/2019 with persistent hyperglycemia.     ARI antibody weakly positive, indicating possible type 1 diabetes. Cpeptide remains in normal range- so pt still has endogenous insulin production.    This is Kar's second admission to obs unit for persistent hyperglycemia, and he admits he is not taking metformin or insulins on a regular basis (missing aspart all the time, glargine half the time and metformin 75% of time).  He identifies forgetfulness and fatigue as the barriers to taking insulin more regularly.  His wife and son are trying to support him.  He has now established with PCP at  "[facility], whom he trusts.       Plan  We had a long discussion trying to determine a regimen that would be easier for Kar to follow.  The main other option would be NovoLog 70/30 mixture insulin, which would require 2 injections/day.  At this time, however, Kar would like to continue with glargine and aspart and he stated \"I just need to take them\".  I strongly recommended close follow-up with [facility], and if he is having difficulty taking the glargine and aspart doses regularly with then not delayed trying NovoLog 70/30 mixture insulin instead.    While in hospital:  -Continue glargine 40 units nightly  -Aspart for meals and snacks: 1 unit per 6 g carbohydrate  -Aspart for correction: High intensity AC and at bedtime  -Monitor glucoses before meals, at bedtime, 0200    Plan for home:  -Glargine 40 units nightly (patient says this is the best time for him to take the med)  -Aspart 12 units per meal.  If glucoses are running high on this dose increase to 15 units per meal, if still running high increase to 20 units per meal.  -Start by checking glucose1-2x daily, and try to increase to before meals  -set alarm on cell phone to remind yourself to take insulin/check glucose  -let your family be involved- they can help support your effort to get better control of glucose  -Follow-up at [facility] within 1-2 weeks    Plan reviewed with pt and his family, and primary team.    Sol Anguiano PA-C [phone]  Diabetes Management Team Job Code [id number]  I spent a total of 80 minutes bedside and on the inpatient unit managing the glycemic care of Kar Up Jr.. Over 50% of my time on the unit was spent counseling the patient  and/or coordinating care regarding glucose management in the context of ongoing hyperglycemia outpatient and frequently missed insulin doses.  See note for details.      "

## 2019-01-05 NOTE — PLAN OF CARE
Outpatient/Observation goals to be met before discharge home:     PRIMARY DIAGNOSIS: Hyperglycemia  OUTPATIENT/OBSERVATION GOALS TO BE MET BEFORE DISCHARGE:  BG >70 and <250 on two consecutive readings: NO  Ketones absent from urine: NO-test not ordered for recheck.  Tolerating oral intake to maintain hydration: YES-pt also receiving IV hydration at this time.  Safe disposition plan has been identified: NO-yet to be determined.  *Nurse to notify MD when observation goals have been met and patient is ready for discharge

## 2019-01-05 NOTE — PROGRESS NOTES
The patient was seen and examined by me and the case was discussed with the MARYCRUZ. We are in agreement with the assessment and plan.    Past Medical History:   Diagnosis Date     Diabetes (H)      Hyperlipidemia      Hypertension      Social History     Socioeconomic History     Marital status:      Spouse name: Not on file     Number of children: Not on file     Years of education: Not on file     Highest education level: Not on file   Social Needs     Financial resource strain: Not on file     Food insecurity - worry: Not on file     Food insecurity - inability: Not on file     Transportation needs - medical: Not on file     Transportation needs - non-medical: Not on file   Occupational History     Not on file   Tobacco Use     Smoking status: Current Every Day Smoker     Packs/day: 1.50     Smokeless tobacco: Never Used   Substance and Sexual Activity     Alcohol use: No     Drug use: No     Sexual activity: Not on file   Other Topics Concern     Not on file   Social History Narrative     Not on file     This is a 48-year-old gentleman with multiple chronic medical issues including insulin-dependent diabetes hypertension smoking and bronchospasm.  He was admitted with elevated blood sugars but no DKA.  He was started on insulin and fluids also he is hypertensive he is on a low-dose of HCTZ which is probably an adequate.  Goals of admission were improved blood sugar control, improved blood pressure management.    Physical exam:  General: Awake alert no acute distress  Cardiac: Regular rate and rhythm no murmurs rubs gallops  Respiratory: No distress he does have expiratory wheezing.    Assessment and plan: IV fluids, insulin to manage blood sugar, endocrinology consult appreciated for blood sugar management.  Regarding hypertension will consider either increasing HCTZ or possible substitution for another antihypertensive.  We talked about smoking cessation and the fact that he has bronchospasm.  He needs  better primary care follow-up and medication compliance.  We talked about complications of his illnesses if he has long-standing poor control.  Anticipate discharge home later today when blood sugar is normalized and hypertension plan formulated.  He will need close follow-up at his clinic.

## 2019-01-05 NOTE — ED NOTES
[facility], [place]   ED Nurse to Floor Handoff     Kar Up Jr. is a 48 year old male who speaks English and lives with a spouse,  in a home  They arrived in the ED by car from home    ED Chief Complaint: Abdominal Pain    ED Dx;   Final diagnoses:   Uncontrolled type 2 diabetes mellitus with hyperglycemia, with long-term current use of insulin (H)         Needed?: No    Allergies:   Allergies   Allergen Reactions     Bactrim [Sulfamethoxazole W/Trimethoprim] Blisters   .  Past Medical Hx:   Past Medical History:   Diagnosis Date     Diabetes (H)      Hyperlipidemia      Hypertension       Baseline Mental status: WDL  Current Mental Status changes: at basesline    Infection present or suspected this encounter: no  Sepsis suspected: No  Isolation type: No active isolations     Activity level - Baseline/Home:  Independent  Activity Level - Current:   Independent    Bariatric equipment needed?: No    In the ED these meds were given:   Medications   sodium chloride (PF) 0.9% PF flush 3 mL (not administered)   sodium chloride (PF) 0.9% PF flush 3 mL (not administered)   0.9% sodium chloride BOLUS (0 mLs Intravenous Stopped 1/4/19 2145)     Followed by   sodium chloride 0.9% infusion (not administered)   HYDROmorphone (PF) (DILAUDID) injection 0.5 mg (0.5 mg Intravenous Given 1/4/19 2010)   0.9% sodium chloride BOLUS (1,000 mLs Intravenous New Bag 1/4/19 2145)   insulin aspart (NovoLOG) inj (RAPID ACTING) (10 Units Subcutaneous Given 1/4/19 2145)       Drips running?  Yes    Home pump  No    Current LDAs  Peripheral IV 11/15/18 Right Lower forearm (Active)   Number of days: 50       Peripheral IV 11/16/18 Left Upper forearm (Active)   Number of days: 49       Peripheral IV 01/04/19 Left Lower forearm (Active)   Site Assessment WDL 1/4/2019  8:06 PM   Line Status Infusing 1/4/2019  8:06 PM   Dressing Intervention New dressing  1/4/2019  8:06 PM   Number of days: 0       Labs  "results:   Labs Ordered and Resulted from Time of ED Arrival Up to the Time of Departure from the ED   GLUCOSE BY METER - Abnormal; Notable for the following components:       Result Value    Glucose 359 (*)     All other components within normal limits   COMPREHENSIVE METABOLIC PANEL - Abnormal; Notable for the following components:    Sodium 130 (*)     Glucose 467 (*)     Calcium 8.4 (*)     All other components within normal limits   CK TOTAL - Abnormal; Notable for the following components:    CK Total 524 (*)     All other components within normal limits   GLUCOSE MONITOR NURSING POCT   CBC WITH PLATELETS DIFFERENTIAL   INR   MAGNESIUM   PHOSPHORUS   LACTIC ACID WHOLE BLOOD   TROPONIN I   KETONE BETA-HYDROXYBUTYRATE QUANTITATIVE   ROUTINE UA WITH MICROSCOPIC REFLEX TO CULTURE   PERIPHERAL IV CATHETER       Imaging Studies:   Recent Results (from the past 24 hour(s))   XR Chest 2 Views    Narrative    Exam: Two-view chest radiograph 1/4/2019 9:32 PM.    Indication: HYPERGLYCEMIA    Comparison: 11/3/2018    Findings: PA and lateral views of the chest are obtained. The cardiac  silhouette, mediastinum, hemidiaphragms, and costophrenic angles are  clear. Pulmonary vasculature and sagar are normal in appearance. No  airspace opacities. No pneumothorax. No acute bony abnormalities.      Impression    Impression: No acute airspace disease.     I have personally reviewed the examination and initial interpretation  and I agree with the findings.    NUNO ERICKSON MD       Recent vital signs:   BP (!) 144/97   Pulse 79   Temp 98.6  F (37  C) (Oral)   Resp 13   Ht 1.88 m (6' 2\")   Wt 129.3 kg (285 lb)   SpO2 94%   BMI 36.59 kg/m      Cardiac Rhythm: Normal Sinus  Pt needs tele? No  Skin/wound Issues: None    Code Status: Full Code    Pain control: good    Nausea control: good    Abnormal labs/tests/findings requiring intervention: 467 Glucose, 524 CK    Family present during ED course? Yes   Family Comments/Social " Situation comments: NA    Tasks needing completion: None    Lul Smith, RN    [phone]

## 2019-01-05 NOTE — PLAN OF CARE
Outpatient/Observation goals to be met before discharge home:    PRIMARY DIAGNOSIS: Hyperglycemia  OUTPATIENT/OBSERVATION GOALS TO BE MET BEFORE DISCHARGE:  BG >70 and <250 on two consecutive readings: NO- pt's BG has been above 300.  Ketones absent from urine: NO-test not ordered for recheck.  Tolerating oral intake to maintain hydration: YES-pt also receiving IV hydration at this time.  Safe disposition plan has been identified: NO-yet to be determined.  *Nurse to notify MD when observation goals have been met and patient is ready for discharge.

## 2019-01-05 NOTE — PLAN OF CARE
Outpatient/Observation goals to be met before discharge home:     PRIMARY DIAGNOSIS: Hyperglycemia  OUTPATIENT/OBSERVATION GOALS TO BE MET BEFORE DISCHARGE:  BG >70 and <250 on two consecutive readings: Yes  at 1200pm  Ketones absent from urine: NO-test not ordered for recheck.  Tolerating oral intake to maintain hydration: YES-pt also receiving IV hydration at this time.  Safe disposition plan has been identified: NO-yet to be determined.    *Nurse to notify MD when observation goals have been met and patient is ready for discharge

## 2019-01-05 NOTE — DISCHARGE SUMMARY
Discharge Summary    Kar Up Jr. MRN# [id number]   YOB: 1970 Age: 48 year old     Date of Admission:  1/4/2019  Date of Discharge:  1/5/2019  Admitting Physician:  Zach Alvarez MD  Discharge Physician:  Zach Alvarez  Discharging Service:  Emergency Medicine     Primary Provider: [facility]          Discharge Diagnosis:     Hyperglycemia    * No resolved hospital problems. *               Discharge Disposition:   Discharged to home           Condition on Discharge:   Discharge condition: Stable   Code status on discharge: Full Code           Procedures:   No procedures performed during this admission          Discharge Medications:     Current Discharge Medication List      START taking these medications    Details   albuterol (PROAIR HFA/PROVENTIL HFA/VENTOLIN HFA) 108 (90 Base) MCG/ACT inhaler Inhale 2 puffs into the lungs every 6 hours  Qty: 18 g, Refills: 0    Associated Diagnoses: Mild intermittent asthma, unspecified whether complicated      amLODIPine (NORVASC) 10 MG tablet Take 1 tablet (10 mg) by mouth daily  Qty: 30 tablet, Refills: 0    Associated Diagnoses: Essential hypertension         CONTINUE these medications which have NOT CHANGED    Details   insulin aspart (NOVOLOG PEN) 100 UNIT/ML injection Do not take bedtime correction Insulin if BG less than 200.        For  - 224 take 1 units.        For  - 249 take 2 units.        For  - 274 take 3 units.        For  - 299 take 4 units.        For  - 324 take 5 units.        For  - 349 take 6 units.        For BG greater than or equal to 350 give 7 units.  Qty: 1000 mL, Refills: 3    Associated Diagnoses: Hyperglycemia      insulin glargine (LANTUS SOLOSTAR) 100 UNIT/ML pen Inject 32 Units Subcutaneous every morning  Qty: 1000 mL, Refills: 3    Associated Diagnoses: Uncontrolled insulin dependent diabetes mellitus (H)      metFORMIN (GLUCOPHAGE) 500 MG tablet Take 1 tablet  (500 mg) by mouth 2 times daily (with meals)  Qty: 60 tablet, Refills: 3    Associated Diagnoses: Hyperglycemia         STOP taking these medications       cyclobenzaprine (FLEXERIL) 10 MG tablet Comments:   Reason for Stopping:         hydrochlorothiazide (MICROZIDE) 12.5 MG capsule Comments:   Reason for Stopping:         rosuvastatin (CRESTOR) 20 MG tablet Comments:   Reason for Stopping:                     Consultations:   Consultation during this admission received from endocrinology             Brief History of Illness:   Please see detailed H&P from 1/4/19, in brief: Kar pU Jr. is a 48 year old male admitted on 1/4/2019. He has a history of DMII (insulin dependent), HTN, HLD who presented to the ED with bilateral LE cramping today.                Hospital Course:   1. Hyperglycemia: last couple of weeks glucose has been running in 400's to TOO HIGH TO READ on his glucometer but worse and more highs in the last 3 days. Admits to polyuria, polydipsia, polyphagia. Has been doing more strenuous work recently but keeping up with fluid intake but probably has not been eating well. Last admission to Obs on 11/15/18 for hyperglycemia Endocrine was consulted and recommended increase Glargine to 32units every morning, Aspart 12units with meals and continue Metformin 500mg BID but increase to 1000mg BID in a few days if tolerating which indeed was done. discharge on 11/17/18 and followed in [facility] several times and the last was just before irma at which time his Lantus dose was increased from 32 units at bedtime to 40 units at bedtime due to uncontrolled glucose levels. In ED,  on presentation to 70's-90's, 's-150's/70's-90's, RR 12-19, SaO2 94-99% on RA, Temp 98.6. Labs show CMP with Na 130, Ca 8.4, glucose 467 otherwise normal LFT's, Mag, Phos. Normal lactic acid, serum ketones. CK elevated at 524. Negative Troponin I x 1. Normal CBC. UA with >1000 glucose, trace blood. INR  "normal. CXR negative. In the ED the patient was given 1L NS bolus, 10 units Novolog, dilaudid 0.5mg IV x 1.       Last A1c was 10.9 on 11/15/18, A1C now 12.3.  On Lantus 40 units at bedtime, Novolog 12 units TID ac plus sliding scale, Metformin 1000mg BID, endocrine consulted and patient admitted to not taking his insulin or metformin regularly.    - Continue with Lantus per home regimen  - Novolog 12 units with meals  - BG checks TID ac and Qhs  - Carbohydrate Consistent Diet  - follow up with PCP at [facility] clinic in one week, discuss BP, glucose, and recheck CK     2. Elevated CK:  states a few weeks ago he was awoken by severe muscle cramps and was seen by his clinic at which time his lipitor was changed to crestor. Again had muscle cramps today. . Could be related to recent strenuous work, statin and/or some dehydration though lab work today is not concerning for dehydration. Was given 1L NS in ED and IVF in the obs unit.  Recheck was 616.    - Hold Crestor and follow up with PCP     3. HTN: - stop hydrochlorothiazide and start amlodipine 10 mg po daily, follow up with PCP For BP recheck    4. Asthma: states he was diagnosed with asthma as a child, but doesn't have an albuterol inhaler anymore, admits to smoking.  Has wheezing on exam today.  Given albuterol inhaler on discharge and smoking cessation was encouraged.                    Final Day of Progress before Discharge:       Physical Exam:  Blood pressure (!) 157/112, pulse 78, temperature 97.9  F (36.6  C), temperature source Oral, resp. rate 18, height 1.88 m (6' 2\"), weight 129.5 kg (285 lb 9.6 oz), SpO2 93 %.    EXAM:  Exam:  Constitutional: healthy, alert and no distress  Cardiovascular: No lifts, heaves, or thrills. RRR. No murmurs, clicks gallops or rub  Respiratory: Good diaphragmatic excursion. Lungs with expiratory wheezing  Gastrointestinal: Abdomen soft, non-tender. BS normal. No masses, organomegaly  Musculoskeletal: extremities normal- no " "gross deformities noted, gait normal and normal muscle tone  Skin: no suspicious lesions or rashes  Neurologic: Gait normal. Alert and oriented.   Psychiatric: mentation appears normal and affect normal/bright    BP (!) 157/112 (BP Location: Right arm)   Pulse 78   Temp 97.9  F (36.6  C) (Oral)   Resp 18   Ht 1.88 m (6' 2\")   Wt 129.5 kg (285 lb 9.6 oz)   SpO2 93%   BMI 36.67 kg/m               Data:  All laboratory data reviewed             Significant Results:     Results for orders placed or performed during the hospital encounter of 01/04/19   XR Chest 2 Views    Narrative    Exam: Two-view chest radiograph 1/4/2019 9:32 PM.    Indication: HYPERGLYCEMIA    Comparison: 11/3/2018    Findings: PA and lateral views of the chest are obtained. The cardiac  silhouette, mediastinum, hemidiaphragms, and costophrenic angles are  clear. Pulmonary vasculature and sagar are normal in appearance. No  airspace opacities. No pneumothorax. No acute bony abnormalities.      Impression    Impression: No acute airspace disease.     I have personally reviewed the examination and initial interpretation  and I agree with the findings.    NUNO ERICKSON MD   Glucose by meter   Result Value Ref Range    Glucose 359 (H) 70 - 99 mg/dL   CBC with platelets differential   Result Value Ref Range    WBC 10.5 4.0 - 11.0 10e9/L    RBC Count 5.28 4.4 - 5.9 10e12/L    Hemoglobin 16.1 13.3 - 17.7 g/dL    Hematocrit 47.1 40.0 - 53.0 %    MCV 89 78 - 100 fl    MCH 30.5 26.5 - 33.0 pg    MCHC 34.2 31.5 - 36.5 g/dL    RDW 12.7 10.0 - 15.0 %    Platelet Count 171 150 - 450 10e9/L    Diff Method Automated Method     % Neutrophils 70.5 %    % Lymphocytes 20.4 %    % Monocytes 7.8 %    % Eosinophils 0.8 %    % Basophils 0.2 %    % Immature Granulocytes 0.3 %    Nucleated RBCs 0 0 /100    Absolute Neutrophil 7.4 1.6 - 8.3 10e9/L    Absolute Lymphocytes 2.1 0.8 - 5.3 10e9/L    Absolute Monocytes 0.8 0.0 - 1.3 10e9/L    Absolute Eosinophils 0.1 0.0 - " 0.7 10e9/L    Absolute Basophils 0.0 0.0 - 0.2 10e9/L    Abs Immature Granulocytes 0.0 0 - 0.4 10e9/L    Absolute Nucleated RBC 0.0    INR   Result Value Ref Range    INR 0.99 0.86 - 1.14   Comprehensive metabolic panel   Result Value Ref Range    Sodium 130 (L) 133 - 144 mmol/L    Potassium 4.0 3.4 - 5.3 mmol/L    Chloride 98 94 - 109 mmol/L    Carbon Dioxide 24 20 - 32 mmol/L    Anion Gap 8 3 - 14 mmol/L    Glucose 467 (H) 70 - 99 mg/dL    Urea Nitrogen 23 7 - 30 mg/dL    Creatinine 1.17 0.66 - 1.25 mg/dL    GFR Estimate 73 >60 mL/min/[1.73_m2]    GFR Estimate If Black 85 >60 mL/min/[1.73_m2]    Calcium 8.4 (L) 8.5 - 10.1 mg/dL    Bilirubin Total 0.3 0.2 - 1.3 mg/dL    Albumin 3.7 3.4 - 5.0 g/dL    Protein Total 7.5 6.8 - 8.8 g/dL    Alkaline Phosphatase 150 40 - 150 U/L    ALT 28 0 - 70 U/L    AST 18 0 - 45 U/L   Magnesium   Result Value Ref Range    Magnesium 2.2 1.6 - 2.3 mg/dL   Phosphorus   Result Value Ref Range    Phosphorus 3.1 2.5 - 4.5 mg/dL   Lactic acid whole blood   Result Value Ref Range    Lactic Acid 1.4 0.7 - 2.0 mmol/L   CK total   Result Value Ref Range    CK Total 524 (H) 30 - 300 U/L   Troponin I   Result Value Ref Range    Troponin I ES <0.015 0.000 - 0.045 ug/L   Ketone Beta-Hydroxybutyrate Quantitative   Result Value Ref Range    Ketone Quantitative 0.1 0.0 - 0.6 mmol/L   UA with Microscopic reflex to Culture   Result Value Ref Range    Color Urine Light Yellow     Appearance Urine Clear     Glucose Urine >1000 (A) NEG^Negative mg/dL    Bilirubin Urine Negative NEG^Negative    Ketones Urine Negative NEG^Negative mg/dL    Specific Gravity Urine 1.026 1.003 - 1.035    Blood Urine Trace (A) NEG^Negative    pH Urine 5.0 5.0 - 7.0 pH    Protein Albumin Urine Negative NEG^Negative mg/dL    Urobilinogen mg/dL Normal 0.0 - 2.0 mg/dL    Nitrite Urine Negative NEG^Negative    Leukocyte Esterase Urine Negative NEG^Negative    Source Midstream Urine     WBC Urine 2 0 - 5 /HPF    RBC Urine <1 0 - 2 /HPF    Glucose by meter   Result Value Ref Range    Glucose 308 (H) 70 - 99 mg/dL   Drug abuse screen 6 urine (chem dep) ([facility])   Result Value Ref Range    Amphetamine Qual Urine Negative NEG^Negative    Barbiturates Qual Urine Negative NEG^Negative    Benzodiazepine Qual Urine Negative NEG^Negative    Cannabinoids Qual Urine Positive (A) NEG^Negative    Cocaine Qual Urine Negative NEG^Negative    Ethanol Qual Urine Negative NEG^Negative    Opiates Qualitative Urine Negative NEG^Negative   Hemoglobin A1c   Result Value Ref Range    Hemoglobin A1C 12.3 (H) 0 - 5.6 %   Glucose by meter   Result Value Ref Range    Glucose 416 (H) 70 - 99 mg/dL   CBC with platelets differential   Result Value Ref Range    WBC 7.7 4.0 - 11.0 10e9/L    RBC Count 5.12 4.4 - 5.9 10e12/L    Hemoglobin 15.5 13.3 - 17.7 g/dL    Hematocrit 46.1 40.0 - 53.0 %    MCV 90 78 - 100 fl    MCH 30.3 26.5 - 33.0 pg    MCHC 33.6 31.5 - 36.5 g/dL    RDW 12.8 10.0 - 15.0 %    Platelet Count 152 150 - 450 10e9/L    Diff Method Automated Method     % Neutrophils 62.7 %    % Lymphocytes 28.9 %    % Monocytes 6.8 %    % Eosinophils 1.2 %    % Basophils 0.3 %    % Immature Granulocytes 0.1 %    Nucleated RBCs 0 0 /100    Absolute Neutrophil 4.8 1.6 - 8.3 10e9/L    Absolute Lymphocytes 2.2 0.8 - 5.3 10e9/L    Absolute Monocytes 0.5 0.0 - 1.3 10e9/L    Absolute Eosinophils 0.1 0.0 - 0.7 10e9/L    Absolute Basophils 0.0 0.0 - 0.2 10e9/L    Abs Immature Granulocytes 0.0 0 - 0.4 10e9/L    Absolute Nucleated RBC 0.0    Comprehensive metabolic panel   Result Value Ref Range    Sodium 139 133 - 144 mmol/L    Potassium 4.0 3.4 - 5.3 mmol/L    Chloride 109 94 - 109 mmol/L    Carbon Dioxide 23 20 - 32 mmol/L    Anion Gap 7 3 - 14 mmol/L    Glucose 238 (H) 70 - 99 mg/dL    Urea Nitrogen 16 7 - 30 mg/dL    Creatinine 1.16 0.66 - 1.25 mg/dL    GFR Estimate 74 >60 mL/min/[1.73_m2]    GFR Estimate If Black 86 >60 mL/min/[1.73_m2]    Calcium 8.2 (L) 8.5 - 10.1 mg/dL    Bilirubin  Total 0.4 0.2 - 1.3 mg/dL    Albumin 3.0 (L) 3.4 - 5.0 g/dL    Protein Total 6.5 (L) 6.8 - 8.8 g/dL    Alkaline Phosphatase 122 40 - 150 U/L    ALT 22 0 - 70 U/L    AST 17 0 - 45 U/L   CK total   Result Value Ref Range    CK Total 616 (H) 30 - 300 U/L   Glucose by meter   Result Value Ref Range    Glucose 167 (H) 70 - 99 mg/dL   Glucose by meter   Result Value Ref Range    Glucose 142 (H) 70 - 99 mg/dL   EKG 12 lead   Result Value Ref Range    Interpretation ECG Click View Image link to view waveform and result       Recent Results (from the past 48 hour(s))   XR Chest 2 Views    Narrative    Exam: Two-view chest radiograph 1/4/2019 9:32 PM.    Indication: HYPERGLYCEMIA    Comparison: 11/3/2018    Findings: PA and lateral views of the chest are obtained. The cardiac  silhouette, mediastinum, hemidiaphragms, and costophrenic angles are  clear. Pulmonary vasculature and sagar are normal in appearance. No  airspace opacities. No pneumothorax. No acute bony abnormalities.      Impression    Impression: No acute airspace disease.     I have personally reviewed the examination and initial interpretation  and I agree with the findings.    NUNO ERICKSON MD                Pending Results:   Unresulted Labs Ordered in the Past 30 Days of this Admission     No orders found for last 61 day(s).                  Discharge Instructions and Follow-Up:     Discharge Procedure Orders   Reason for your hospital stay   Order Comments: High blood sugar     Follow Up and recommended labs and tests   Order Comments: Follow up with primary care provider, Clinic [facility], within 7 days for hospital follow- up, check blood sugar and check BP, check CK (lab)     Activity   Order Comments: Your activity upon discharge: activity as tolerated     Order Specific Question Answer Comments   Is discharge order? Yes      Monitor and record   Order Comments: blood glucose 4 times a day, before meals and at bedtime     When to contact your care team    Order Comments: Return to the ER if you have new or worsening chest pain, shortness of breath, jaw or arm pain, or fainting, blood sugar over 500 despite insulin use or severe leg cramps/pain     Diet   Order Comments: Follow this diet upon discharge: Orders Placed This Encounter      Moderate Consistent CHO Diet     Order Specific Question Answer Comments   Is discharge order? Yes           Attestation:  Mila Martinez.  APRN, CNP

## 2019-01-09 ENCOUNTER — OFFICE VISIT (OUTPATIENT)
Dept: OPHTHALMOLOGY | Facility: CLINIC | Age: 49
End: 2019-01-09

## 2019-01-09 DIAGNOSIS — H40.003 GLAUCOMA SUSPECT OF BOTH EYES: Primary | ICD-10-CM

## 2019-01-09 ASSESSMENT — VISUAL ACUITY
OD_CC: 20/20
OS_CC: 20/20
CORRECTION_TYPE: GLASSES
METHOD: SNELLEN - LINEAR

## 2019-01-09 ASSESSMENT — SLIT LAMP EXAM - LIDS
COMMENTS: NORMAL
COMMENTS: NORMAL

## 2019-01-09 ASSESSMENT — TONOMETRY
OS_IOP_MMHG: 21
OD_IOP_MMHG: 21
IOP_METHOD: TONOPEN

## 2019-01-09 ASSESSMENT — EXTERNAL EXAM - RIGHT EYE: OD_EXAM: NORMAL

## 2019-01-09 ASSESSMENT — PACHYMETRY
OS_CT(UM): 511
OD_CT(UM): 502

## 2019-01-09 ASSESSMENT — EXTERNAL EXAM - LEFT EYE: OS_EXAM: NORMAL

## 2019-01-09 ASSESSMENT — CUP TO DISC RATIO
OS_RATIO: 0.8
OD_RATIO: 0.9

## 2019-01-09 NOTE — PROGRESS NOTES
History  HPI     Glaucoma Follow-Up     In both eyes.              Comments     Patient happy with new glasses her received follow his last visit.          Last edited by Malaika Harley RN on 1/9/2019  1:05 PM. 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 No treatment indicated at this time. Monitor closely.    Return to clinic in 3-6 months for follow-up including repeat visual field, tonometry, and gonioscopy.    Complete documentation of historical and exam elements from today's encounter can  be found in the full encounter summary report (not reduplicated in this progress  note). I personally obtained the chief complaint(s) and history of present illness. I  confirmed and edited as necessary the review of systems, past medical/surgical  history, family history, social history, and examination findings as documented by  others; and I examined the patient myself. I personally reviewed the relevant tests,  images, and reports as documented above. I formulated and edited as necessary the  assessment and plan and discussed the findings and management plan with the  patient and family.    Juan Dumont OD, FAAO

## 2019-01-14 ENCOUNTER — PRE VISIT (OUTPATIENT)
Dept: SLEEP MEDICINE | Facility: CLINIC | Age: 49
End: 2019-01-14

## 2019-01-14 NOTE — TELEPHONE ENCOUNTER
"  1.  Reason for the visit:  Consult to discuss snoring  2.  Referring provider and clinic name:  Dr. Lori Barry [facility]  3.  Previous Sleep Doctor or Pulmonlogist (clinic name)?  None noted  4.  Records, Procedures, Imaging, and Labs (see below)  No records to obtain        All NOTES from previous office visits that pertain to why they are being seen in the Sleep Center    Previous Sleep Studies, Chest CT, Echos and reports that pertain to why they are seeing Sleep Center    All Sleep records that have been done in the last 2 years that pertain to why they are seeing Sleep Center            Are they being seen for continuation of care for Cpap/Bipap/Avap/Trilogy/Dental Device? none    If yes to above Who and Where was Device issued/currently getting supplies from? na    Are you currently on \"Supplemental Oxygen\" during the day or night?   na                                                                                                                                                      Please remind pt to bring Cpap machine and ask to arrive 15 minutes early to appointment due traffic and congestion                                                 5. Pt Sleep Center Packet received Spoke with pt and asked that he arrive 30 mins early to appointment to complete packet    Yes: \"please make sure that you bring this to your appointment completed, either the doctor will not see you until this completed or you may be asked to reschedule your appointment.\"     No: mail or email to the pt and explain, \"please make sure that you bring this to your appointment completed, either the doctor will not see you until this completed or you may be asked to reschedule your appointment.\"     ~If pt coming early to fill packet out, ask that they come 30 minutes prior to their appointment~     6. Has the pt's medication list been updated and preferred pharmacy added?     7. Has the allergy list been " "reviewed?    \"Thank you for choosing [facility] and we look forward to seeing you at your upcoming appointment\"     "

## 2019-01-16 ENCOUNTER — TELEPHONE (OUTPATIENT)
Dept: OPHTHALMOLOGY | Facility: CLINIC | Age: 49
End: 2019-01-16

## 2019-04-21 ENCOUNTER — HOSPITAL ENCOUNTER (EMERGENCY)
Facility: CLINIC | Age: 49
Discharge: HOME | End: 2019-04-21
Attending: EMERGENCY MEDICINE | Admitting: EMERGENCY MEDICINE

## 2019-04-21 ENCOUNTER — APPOINTMENT (OUTPATIENT)
Dept: GENERAL RADIOLOGY | Facility: CLINIC | Age: 49
End: 2019-04-21
Attending: EMERGENCY MEDICINE

## 2019-04-21 VITALS
TEMPERATURE: 98 F | WEIGHT: 270 LBS | RESPIRATION RATE: 22 BRPM | OXYGEN SATURATION: 98 % | SYSTOLIC BLOOD PRESSURE: 126 MMHG | DIASTOLIC BLOOD PRESSURE: 95 MMHG | BODY MASS INDEX: 34.65 KG/M2 | HEIGHT: 74 IN

## 2019-04-21 DIAGNOSIS — R73.9 HYPERGLYCEMIA: ICD-10-CM

## 2019-04-21 DIAGNOSIS — I10 ESSENTIAL HYPERTENSION: ICD-10-CM

## 2019-04-21 LAB
ALBUMIN SERPL-MCNC: 3.6 G/DL (ref 3.4–5)
ALP SERPL-CCNC: 143 U/L (ref 40–150)
ALT SERPL W P-5'-P-CCNC: 26 U/L (ref 0–70)
ANION GAP SERPL CALCULATED.3IONS-SCNC: 14 MMOL/L (ref 3–14)
AST SERPL W P-5'-P-CCNC: 16 U/L (ref 0–45)
BASOPHILS # BLD AUTO: 0 10E9/L (ref 0–0.2)
BASOPHILS NFR BLD AUTO: 0.4 %
BILIRUB SERPL-MCNC: 0.7 MG/DL (ref 0.2–1.3)
BUN SERPL-MCNC: 18 MG/DL (ref 7–30)
CALCIUM SERPL-MCNC: 8.5 MG/DL (ref 8.5–10.1)
CHLORIDE SERPL-SCNC: 88 MMOL/L (ref 94–109)
CO2 SERPL-SCNC: 23 MMOL/L (ref 20–32)
CREAT SERPL-MCNC: 1.33 MG/DL (ref 0.66–1.25)
DIFFERENTIAL METHOD BLD: ABNORMAL
EOSINOPHIL # BLD AUTO: 0 10E9/L (ref 0–0.7)
EOSINOPHIL NFR BLD AUTO: 0.4 %
ERYTHROCYTE [DISTWIDTH] IN BLOOD BY AUTOMATED COUNT: 12.3 % (ref 10–15)
GFR SERPL CREATININE-BSD FRML MDRD: 63 ML/MIN/{1.73_M2}
GLUCOSE BLDC GLUCOMTR-MCNC: 590 MG/DL (ref 70–99)
GLUCOSE BLDC GLUCOMTR-MCNC: >600 MG/DL (ref 70–99)
GLUCOSE SERPL-MCNC: 938 MG/DL (ref 70–99)
HCT VFR BLD AUTO: 51.7 % (ref 40–53)
HGB BLD-MCNC: 16.4 G/DL (ref 13.3–17.7)
IMM GRANULOCYTES # BLD: 0 10E9/L (ref 0–0.4)
IMM GRANULOCYTES NFR BLD: 0.3 %
LYMPHOCYTES # BLD AUTO: 1.1 10E9/L (ref 0.8–5.3)
LYMPHOCYTES NFR BLD AUTO: 14.1 %
MCH RBC QN AUTO: 29.8 PG (ref 26.5–33)
MCHC RBC AUTO-ENTMCNC: 31.7 G/DL (ref 31.5–36.5)
MCV RBC AUTO: 94 FL (ref 78–100)
MONOCYTES # BLD AUTO: 0.6 10E9/L (ref 0–1.3)
MONOCYTES NFR BLD AUTO: 7.6 %
NEUTROPHILS # BLD AUTO: 5.8 10E9/L (ref 1.6–8.3)
NEUTROPHILS NFR BLD AUTO: 77.2 %
NRBC # BLD AUTO: 0 10*3/UL
NRBC BLD AUTO-RTO: 0 /100
NT-PROBNP SERPL-MCNC: <5 PG/ML (ref 0–450)
PLATELET # BLD AUTO: 115 10E9/L (ref 150–450)
PLATELET # BLD EST: ABNORMAL 10*3/UL
POTASSIUM SERPL-SCNC: 4.7 MMOL/L (ref 3.4–5.3)
PROT SERPL-MCNC: 7.4 G/DL (ref 6.8–8.8)
RBC # BLD AUTO: 5.51 10E12/L (ref 4.4–5.9)
SODIUM SERPL-SCNC: 125 MMOL/L (ref 133–144)
TROPONIN I SERPL-MCNC: <0.015 UG/L (ref 0–0.04)
WBC # BLD AUTO: 7.5 10E9/L (ref 4–11)

## 2019-04-21 PROCEDURE — 25000132 ZZH RX MED GY IP 250 OP 250 PS 637: Performed by: EMERGENCY MEDICINE

## 2019-04-21 PROCEDURE — 85025 COMPLETE CBC W/AUTO DIFF WBC: CPT | Performed by: EMERGENCY MEDICINE

## 2019-04-21 PROCEDURE — 25000131 ZZH RX MED GY IP 250 OP 636 PS 637: Performed by: EMERGENCY MEDICINE

## 2019-04-21 PROCEDURE — 96361 HYDRATE IV INFUSION ADD-ON: CPT

## 2019-04-21 PROCEDURE — 80053 COMPREHEN METABOLIC PANEL: CPT | Performed by: EMERGENCY MEDICINE

## 2019-04-21 PROCEDURE — 25800030 ZZH RX IP 258 OP 636: Performed by: EMERGENCY MEDICINE

## 2019-04-21 PROCEDURE — 00000146 ZZHCL STATISTIC GLUCOSE BY METER IP

## 2019-04-21 PROCEDURE — 96360 HYDRATION IV INFUSION INIT: CPT

## 2019-04-21 PROCEDURE — 25000125 ZZHC RX 250: Performed by: EMERGENCY MEDICINE

## 2019-04-21 PROCEDURE — 93010 ELECTROCARDIOGRAM REPORT: CPT | Mod: Z6 | Performed by: EMERGENCY MEDICINE

## 2019-04-21 PROCEDURE — 25000128 H RX IP 250 OP 636: Performed by: EMERGENCY MEDICINE

## 2019-04-21 PROCEDURE — 83880 ASSAY OF NATRIURETIC PEPTIDE: CPT | Performed by: EMERGENCY MEDICINE

## 2019-04-21 PROCEDURE — 99285 EMERGENCY DEPT VISIT HI MDM: CPT | Mod: 25 | Performed by: EMERGENCY MEDICINE

## 2019-04-21 PROCEDURE — 93005 ELECTROCARDIOGRAM TRACING: CPT

## 2019-04-21 PROCEDURE — 71046 X-RAY EXAM CHEST 2 VIEWS: CPT

## 2019-04-21 PROCEDURE — 99285 EMERGENCY DEPT VISIT HI MDM: CPT | Mod: 25

## 2019-04-21 PROCEDURE — 84484 ASSAY OF TROPONIN QUANT: CPT | Performed by: EMERGENCY MEDICINE

## 2019-04-21 RX ORDER — SODIUM CHLORIDE, SODIUM LACTATE, POTASSIUM CHLORIDE, CALCIUM CHLORIDE 600; 310; 30; 20 MG/100ML; MG/100ML; MG/100ML; MG/100ML
1000 INJECTION, SOLUTION INTRAVENOUS CONTINUOUS
Status: DISCONTINUED | OUTPATIENT
Start: 2019-04-21 | End: 2019-04-21 | Stop reason: HOSPADM

## 2019-04-21 RX ORDER — ASPIRIN 81 MG/1
324 TABLET, CHEWABLE ORAL ONCE
Status: COMPLETED | OUTPATIENT
Start: 2019-04-21 | End: 2019-04-21

## 2019-04-21 RX ORDER — ALUMINA, MAGNESIA, AND SIMETHICONE 2400; 2400; 240 MG/30ML; MG/30ML; MG/30ML
15 SUSPENSION ORAL ONCE
Status: COMPLETED | OUTPATIENT
Start: 2019-04-21 | End: 2019-04-21

## 2019-04-21 RX ORDER — AMLODIPINE BESYLATE 10 MG/1
10 TABLET ORAL DAILY
Qty: 30 TABLET | Refills: 0 | Status: SHIPPED | OUTPATIENT
Start: 2019-04-21

## 2019-04-21 RX ADMIN — SODIUM CHLORIDE, POTASSIUM CHLORIDE, SODIUM LACTATE AND CALCIUM CHLORIDE 1000 ML: 600; 310; 30; 20 INJECTION, SOLUTION INTRAVENOUS at 15:59

## 2019-04-21 RX ADMIN — SODIUM CHLORIDE 1000 ML: 9 INJECTION, SOLUTION INTRAVENOUS at 17:13

## 2019-04-21 RX ADMIN — ALUMINUM HYDROXIDE, MAGNESIUM HYDROXIDE, AND DIMETHICONE 15 ML: 400; 400; 40 SUSPENSION ORAL at 15:48

## 2019-04-21 RX ADMIN — INSULIN GLARGINE 32 UNITS: 100 INJECTION, SOLUTION SUBCUTANEOUS at 16:52

## 2019-04-21 RX ADMIN — LIDOCAINE HYDROCHLORIDE 15 ML: 20 SOLUTION ORAL; TOPICAL at 15:48

## 2019-04-21 RX ADMIN — ASPIRIN 81 MG CHEWABLE TABLET 324 MG: 81 TABLET CHEWABLE at 15:48

## 2019-04-21 RX ADMIN — INSULIN ASPART 8 UNITS: 100 INJECTION, SOLUTION INTRAVENOUS; SUBCUTANEOUS at 17:54

## 2019-04-21 ASSESSMENT — ENCOUNTER SYMPTOMS
DIAPHORESIS: 1
FREQUENCY: 1
SORE THROAT: 1

## 2019-04-21 ASSESSMENT — MIFFLIN-ST. JEOR: SCORE: 2164.46

## 2019-04-21 NOTE — ED PROVIDER NOTES
"    [facility] EMERGENCY DEPARTMENT ([facility])  4/21/19    History     Chief Complaint   Patient presents with     Chest Pain     Hyperglycemia     The history is provided by the patient and medical records.     Kar Up Jr. is a 48 year old male with a past medical history significant for DM2 (insulin dependent), HTN,and HLD who presents to the Emergency Department today due to mid sternal chest pain and hyperglycemia. Patient reports that earlier today he checked his blood sugar and his monitor read \"high\". Patient states that his chest pain started 4 days ago in his mid sternal, and feels as if gas was trapped in there. Patient is currently experiencing sweats and scratching of his throat. Patient has not taken his insulin for the past week and has been out of his BP medication since it ran out an unknown period of time ago. Patient reports that both his mother and grandmother had heart problems. Patient denies using Aspirin. Patient reports urinary frequency and burning of his feet which are not new.    Per chart review patient was hospitalized here from 1/4/2019-1/5/2019 due to bilateral lower extremity cramping. Patient BG then were running in the 400s to high the past few weeks prior. Patient reported polyuria, polydipsia and polyphagia. Patient admitted to not taking his insulin or metformin regularly. He was told to continue Lantus, Novolog 12 units with meals and to check his BG.    I have reviewed the Medications, Allergies, Past Medical and Surgical History, and Social History in the [facility].    Past Medical History:   Diagnosis Date     Diabetes (H)      Hyperlipidemia      Hypertension        History reviewed. No pertinent surgical history.    Family History   Problem Relation Age of Onset     Glaucoma No family hx of      Macular Degeneration No family hx of        Social History     Tobacco Use     Smoking status: Current Every Day Smoker     Packs/day: 1.50     Smokeless tobacco: " "Never Used   Substance Use Topics     Alcohol use: No       No current facility-administered medications for this encounter.      Current Outpatient Medications   Medication     amLODIPine (NORVASC) 10 MG tablet     insulin aspart (NOVOLOG PEN) 100 UNIT/ML pen     insulin glargine (LANTUS SOLOSTAR PEN) 100 UNIT/ML pen     albuterol (PROAIR HFA/PROVENTIL HFA/VENTOLIN HFA) 108 (90 Base) MCG/ACT inhaler     insulin aspart (NOVOLOG PEN) 100 UNIT/ML injection     metFORMIN (GLUCOPHAGE) 500 MG tablet        Allergies   Allergen Reactions     Bactrim [Sulfamethoxazole W/Trimethoprim] Blisters         Review of Systems   Constitutional: Positive for diaphoresis.        Positive for hyperglycemia   HENT: Positive for sore throat.    Cardiovascular: Positive for chest pain (Mid sternal).   Genitourinary: Positive for frequency (Unchanged).   All other systems reviewed and are negative.      Physical Exam   BP: (!) 146/92  Heart Rate: 106  Temp: 98  F (36.7  C)  Resp: 20  Height: 188 cm (6' 2\")  Weight: 122.5 kg (270 lb)  SpO2: 98 %      Physical Exam   Constitutional: He is oriented to person, place, and time. He appears well-developed and well-nourished. No distress.   HENT:   Head: Normocephalic and atraumatic.   Eyes: Pupils are equal, round, and reactive to light. EOM are normal. No scleral icterus.   Neck: Normal range of motion. Neck supple.   Cardiovascular: Intact distal pulses.   Chest reveals no wheezes, no rales. No retractions, tachypnea or cyanosis.     Pulmonary/Chest: Effort normal. No respiratory distress.   Abdominal: Soft.   Musculoskeletal: Normal range of motion. He exhibits no edema.   Neurological: He is alert and oriented to person, place, and time.   Skin: Skin is warm and dry. No rash noted. He is not diaphoretic.       ED Course   3:27 PM  The patient was seen and examined by Patrick Bo MD in Room ED22.        Procedures             EKG Interpretation:      Interpreted by Patrick Vergara " Anupam  Time reviewed:   Symptoms at time of EKG: epigastric pain   Rhythm: normal sinus   Rate: normal  Axis: normal  Ectopy: none  Conduction: normal  ST Segments/ T Waves: No ST-T wave changes  Q Waves: none  Comparison to prior: Unchanged    Clinical Impression: normal EKG          Critical Care time:  none  Results for orders placed or performed during the hospital encounter of 04/21/19   XR Chest 2 Views    Narrative    EXAM: XR CHEST 2 VW  4/21/2019 4:12 PM     HISTORY:  chest pain       COMPARISON: Chest radiograph 1/4/2019    FINDINGS: PA and lateral views of chest. No pneumothorax or pleural  effusion. No focal airspace opacity. Heart size is normal.      Impression    IMPRESSION: No acute finding in the chest. Clear lungs.    I have personally reviewed the examination and initial interpretation  and I agree with the findings.    KAMAR GONG MD   CBC with platelets differential   Result Value Ref Range    WBC 7.5 4.0 - 11.0 10e9/L    RBC Count 5.51 4.4 - 5.9 10e12/L    Hemoglobin 16.4 13.3 - 17.7 g/dL    Hematocrit 51.7 40.0 - 53.0 %    MCV 94 78 - 100 fl    MCH 29.8 26.5 - 33.0 pg    MCHC 31.7 31.5 - 36.5 g/dL    RDW 12.3 10.0 - 15.0 %    Platelet Count 115 (L) 150 - 450 10e9/L    Diff Method Automated Method     % Neutrophils 77.2 %    % Lymphocytes 14.1 %    % Monocytes 7.6 %    % Eosinophils 0.4 %    % Basophils 0.4 %    % Immature Granulocytes 0.3 %    Nucleated RBCs 0 0 /100    Absolute Neutrophil 5.8 1.6 - 8.3 10e9/L    Absolute Lymphocytes 1.1 0.8 - 5.3 10e9/L    Absolute Monocytes 0.6 0.0 - 1.3 10e9/L    Absolute Eosinophils 0.0 0.0 - 0.7 10e9/L    Absolute Basophils 0.0 0.0 - 0.2 10e9/L    Abs Immature Granulocytes 0.0 0 - 0.4 10e9/L    Absolute Nucleated RBC 0.0     Platelet Estimate Confirming automated cell count    Troponin I   Result Value Ref Range    Troponin I ES <0.015 0.000 - 0.045 ug/L   Comprehensive metabolic panel   Result Value Ref Range    Sodium 125 (L) 133 - 144 mmol/L     Potassium 4.7 3.4 - 5.3 mmol/L    Chloride 88 (L) 94 - 109 mmol/L    Carbon Dioxide 23 20 - 32 mmol/L    Anion Gap 14 3 - 14 mmol/L    Glucose 938 (HH) 70 - 99 mg/dL    Urea Nitrogen 18 7 - 30 mg/dL    Creatinine 1.33 (H) 0.66 - 1.25 mg/dL    GFR Estimate 63 >60 mL/min/[1.73_m2]    GFR Estimate If Black 72 >60 mL/min/[1.73_m2]    Calcium 8.5 8.5 - 10.1 mg/dL    Bilirubin Total 0.7 0.2 - 1.3 mg/dL    Albumin 3.6 3.4 - 5.0 g/dL    Protein Total 7.4 6.8 - 8.8 g/dL    Alkaline Phosphatase 143 40 - 150 U/L    ALT 26 0 - 70 U/L    AST 16 0 - 45 U/L   Nt probnp inpatient (BNP)   Result Value Ref Range    N-Terminal Pro BNP Inpatient <5 0 - 450 pg/mL   Glucose by meter   Result Value Ref Range    Glucose >600 (HH) 70 - 99 mg/dL   Glucose by meter   Result Value Ref Range    Glucose 590 (HH) 70 - 99 mg/dL   EKG 12 lead   Result Value Ref Range    Interpretation ECG Click View Image link to view waveform and result                Labs Ordered and Resulted from Time of ED Arrival Up to the Time of Departure from the ED   CBC WITH PLATELETS DIFFERENTIAL - Abnormal; Notable for the following components:       Result Value    Platelet Count 115 (*)     All other components within normal limits   COMPREHENSIVE METABOLIC PANEL - Abnormal; Notable for the following components:    Sodium 125 (*)     Chloride 88 (*)     Glucose 938 (*)     Creatinine 1.33 (*)     All other components within normal limits   GLUCOSE BY METER - Abnormal; Notable for the following components:    Glucose >600 (*)     All other components within normal limits   GLUCOSE BY METER - Abnormal; Notable for the following components:    Glucose 590 (*)     All other components within normal limits   TROPONIN I   NT PROBNP INPATIENT   PULSE OXIMETRY NURSING   CARDIAC CONTINUOUS MONITORING   PERIPHERAL IV CATHETER   PATIENT CARE ORDER   ISTAT TROPONIN NURSING POCT     Medications   aspirin (ASA) chewable tablet 324 mg (324 mg Oral Given 4/21/19 7402)   lactated  "ringers BOLUS 1,000 mL (0 mLs Intravenous Stopped 4/21/19 1650)   alum & mag hydroxide-simethicone (MYLANTA ES/MAALOX  ES) suspension 15 mL (15 mLs Oral Given 4/21/19 1548)     And   lidocaine VISCOUS (XYLOCAINE) 2 % solution 15 mL (15 mLs Mouth/Throat Given 4/21/19 1548)   insulin glargine (LANTUS PEN) injection 32 Units (32 Units Subcutaneous Given 4/21/19 1652)   insulin aspart (NovoLOG) inj (RAPID ACTING) (8 Units Subcutaneous Given 4/21/19 1754)   0.9% sodium chloride BOLUS (0 mLs Intravenous Stopped 4/21/19 1851)            Assessments & Plan (with Medical Decision Making)   This is a 48 year old  male presenting to the ED with hyperglycemia and chest/epigastric discomfort. PT has not taken his insulin for the past week and feels like his blood glucose is high. On exam he is in no obvious distress. Lungs are clear, CV exam is unremarkable. Epigastric discomfort on palpation. ECG shows no signs of acute ischemic changes and no ectopy. Blood glucose is very high but there is no acute concern for DKA. Pt was provided with ASA for chest pain pending cardiac workup. Trop was negative, chest xray was unremarkable. PT was provided with his lantus dose as well as IVF and a dose of novolog. On reassessment he states he fells great and does no want any further treatment or workup in the ED.  His glucosee came down appropriately with treatment and he remains asymptomatic. He will be discharged with insulin, metformin and lantus and instructed to get into a clinic ASAP to establish a primary care. Pt understands and agrees with after care instructions.    I have reviewed the nursing notes.    I have reviewed the findings, diagnosis, plan and need for follow up with the patient.  \"This dictation was performed with the assistance of voice recognition software and may contain inadvertant transcription  errors,  omissions and/or  inadvertent word substitution.\" --Patrick Bo MD        Medication List    "   There are no discharge medications for this visit.         Final diagnoses:   Hyperglycemia     IAnder, am serving as a trained medical scribe to document services personally performed by Patrick Bo MD, based on the provider's statements to me.   Patrick MERCEDSE MD, was physically present and have reviewed and verified the accuracy of this note documented by Ander Romero.    4/21/2019   [facility], [place], EMERGENCY DEPARTMENT     Patrick Bo MD  04/26/19 2945

## 2019-04-21 NOTE — ED TRIAGE NOTES
"Presents with chest pain, diaphoresis and hyperglycemia starting today. Patient states he hasn't been taking his BP or insulin in over a month, his glucose monitor today reported it was \"high\".  "

## 2019-04-21 NOTE — ED AVS SNAPSHOT
[facility], [place], Emergency Department  [address] [place] [id number]  Phone:  [phone]                                    Kar Up Jr.   MRN: [id number]    Department:  [facility], Emergency Department   Date of Visit:  4/21/2019           After Visit Summary Signature Page    I have received my discharge instructions, and my questions have been answered. I have discussed any challenges I see with this plan with the nurse or doctor.    ..........................................................................................................................................  Patient/Patient Representative Signature      ..........................................................................................................................................  Patient Representative Print Name and Relationship to Patient    ..................................................               ................................................  Date                                   Time    ..........................................................................................................................................  Reviewed by Signature/Title    ...................................................              ..............................................  Date                                               Time          22EPIC Rev 08/18

## 2019-04-21 NOTE — DISCHARGE INSTRUCTIONS
Please make an appointment to follow up with [facility] (phone: [phone]) as soon as possible.

## 2019-04-22 LAB — INTERPRETATION ECG - MUSE: NORMAL

## 2021-10-07 PROCEDURE — 88305 TISSUE EXAM BY PATHOLOGIST: CPT | Performed by: PATHOLOGY

## 2021-10-08 ENCOUNTER — LAB REQUISITION (OUTPATIENT)
Dept: LAB | Facility: CLINIC | Age: 51
End: 2021-10-08

## 2021-10-08 DIAGNOSIS — K63.5 POLYP OF COLON: ICD-10-CM

## 2021-10-08 DIAGNOSIS — Z12.11 ENCOUNTER FOR SCREENING FOR MALIGNANT NEOPLASM OF COLON: ICD-10-CM

## 2021-10-08 DIAGNOSIS — K64.4 RESIDUAL HEMORRHOIDAL SKIN TAGS: ICD-10-CM

## 2021-10-11 LAB
PATH REPORT.COMMENTS IMP SPEC: NORMAL
PATH REPORT.COMMENTS IMP SPEC: NORMAL
PATH REPORT.FINAL DX SPEC: NORMAL
PATH REPORT.GROSS SPEC: NORMAL
PATH REPORT.MICROSCOPIC SPEC OTHER STN: NORMAL
PATH REPORT.RELEVANT HX SPEC: NORMAL
PHOTO IMAGE: NORMAL

## 2022-02-17 PROBLEM — E11.8 TYPE 2 DIABETES MELLITUS WITH UNSPECIFIED COMPLICATIONS (H): Status: ACTIVE | Noted: 2018-11-16

## 2025-06-17 NOTE — PHARMACY-ADMISSION MEDICATION HISTORY
Admission medication history interview status for the 11/15/2018 admission is complete. See Epic admission navigator for allergy information, pharmacy, prior to admission medications and immunization status.     Medication history interview sources:  patient, epic chart    Changes made to PTA medication list (reason)  Added: none  Deleted: none  Changed: none    Additional medication history information (including reliability of information, actions taken by pharmacist):Pt knows his meds and allergy well. Pt denies taking any OTCs.      Prior to Admission medications    Medication Sig Last Dose Taking? Auth Provider   cyclobenzaprine (FLEXERIL) 10 MG tablet Take 1 tablet (10 mg) by mouth 3 times daily as needed for muscle spasms 11/14/2018 at PM Yes Mika Pulido,    insulin aspart (NOVOLOG FLEXPEN) 100 UNIT/ML injection 5 units before breakfast, 5 units before lunch, 5 units before dinner 11/15/2018 at AM Yes Beverly Agudelo MD   insulin detemir (LEVEMIR FLEXPEN/FLEXTOUCH) 100 UNIT/ML injection Inject 20 Units Subcutaneous At Bedtime 11/14/2018 at PM Yes Beverly Agudelo MD         Medication history completed by: Karla Glaser, PD4 student    
Vaccine status unknown